# Patient Record
Sex: FEMALE | Race: BLACK OR AFRICAN AMERICAN | NOT HISPANIC OR LATINO | URBAN - METROPOLITAN AREA
[De-identification: names, ages, dates, MRNs, and addresses within clinical notes are randomized per-mention and may not be internally consistent; named-entity substitution may affect disease eponyms.]

---

## 2019-03-26 ENCOUNTER — EMERGENCY (EMERGENCY)
Facility: HOSPITAL | Age: 74
LOS: 0 days | Discharge: HOME | End: 2019-03-27
Attending: EMERGENCY MEDICINE
Payer: COMMERCIAL

## 2019-03-26 VITALS
OXYGEN SATURATION: 100 % | TEMPERATURE: 98 F | HEART RATE: 82 BPM | RESPIRATION RATE: 17 BRPM | WEIGHT: 167.99 LBS | SYSTOLIC BLOOD PRESSURE: 165 MMHG | DIASTOLIC BLOOD PRESSURE: 72 MMHG | HEIGHT: 66 IN

## 2019-03-26 PROCEDURE — 12011 RPR F/E/E/N/L/M 2.5 CM/<: CPT

## 2019-03-26 PROCEDURE — 93010 ELECTROCARDIOGRAM REPORT: CPT

## 2019-03-26 PROCEDURE — 99284 EMERGENCY DEPT VISIT MOD MDM: CPT | Mod: 25

## 2019-03-26 NOTE — ED PROVIDER NOTE - CARE PLAN
Principal Discharge DX:	Facial laceration  Secondary Diagnosis:	Facial contusion  Secondary Diagnosis:	Fall on same level, initial encounter

## 2019-03-26 NOTE — ED PROVIDER NOTE - OBJECTIVE STATEMENT
Pertinent PMH/PSH/FHx/SHx and Review of Systems contained within:    72yo F w PMH pf hyperthyroidism on methimazole presents to ED for eval s/p fall.  Pt states she was walking while holding bags of groceries then suddenly fell forward, hitting her face.  Denies LOC.  Pt states she does not recall tripping on anything, but also denies CP, SOB, dizziness, vision changes, abd pain.    No fever/chills, No photophobia/eye pain/changes in vision, No ear pain/sore throat, No chest pain/palpitations, no SOB/cough/wheeze/stridor, No abdominal pain, no dysuria/frequency/discharge, No neck/back pain, no rash, no changes in neurological status/function.

## 2019-03-26 NOTE — ED ADULT TRIAGE NOTE - CHIEF COMPLAINT QUOTE
Pt presents after falling. [place] herself going down but was carrying bags and could not break her fall, resulting in a forehead lac with bleeding controlled and swelling, also developing raccoon eyes. No rosario signs. Was on methimazole 10mg, was supposed to be reduced to 5mg, but never did reduce it. Endorses dizziness for a few weeks. Not on any blood thinners. No vertigo now while in triage. *friend is concerned pt is not acting like herself*

## 2019-03-26 NOTE — ED PROVIDER NOTE - CLINICAL SUMMARY MEDICAL DECISION MAKING FREE TEXT BOX
Pt s/p fall, ?syncope.  Labs & imaging neg for acute pathology, laceration repaired in ED, pt tolerated procedure well, remained NV intact.  Instructed on care and to have sutures removed in 5-7d.  Discussed results and outcome of testing with the patient, given copy as well.  Patient advised to please follow up with their primary care doctor within the next 24 hours and return to the Emergency Department for worsening symptoms or any other concerns.  Patient advised that their doctor may call  to follow up on the specific results of the tests performed today in the emergency department.

## 2019-03-27 VITALS
HEART RATE: 58 BPM | DIASTOLIC BLOOD PRESSURE: 83 MMHG | SYSTOLIC BLOOD PRESSURE: 141 MMHG | RESPIRATION RATE: 18 BRPM | TEMPERATURE: 98 F | OXYGEN SATURATION: 99 %

## 2019-03-27 LAB
ALBUMIN SERPL ELPH-MCNC: 3.6 G/DL — SIGNIFICANT CHANGE UP (ref 3.3–5)
ALP SERPL-CCNC: 133 U/L — HIGH (ref 40–120)
ALT FLD-CCNC: 18 U/L — SIGNIFICANT CHANGE UP (ref 12–78)
ANION GAP SERPL CALC-SCNC: 7 MMOL/L — SIGNIFICANT CHANGE UP (ref 5–17)
APTT BLD: 31 SEC — SIGNIFICANT CHANGE UP (ref 27.5–36.3)
AST SERPL-CCNC: 19 U/L — SIGNIFICANT CHANGE UP (ref 15–37)
BASOPHILS # BLD AUTO: 0.06 K/UL — SIGNIFICANT CHANGE UP (ref 0–0.2)
BASOPHILS NFR BLD AUTO: 0.7 % — SIGNIFICANT CHANGE UP (ref 0–2)
BILIRUB SERPL-MCNC: 0.2 MG/DL — SIGNIFICANT CHANGE UP (ref 0.2–1.2)
BUN SERPL-MCNC: 12 MG/DL — SIGNIFICANT CHANGE UP (ref 7–23)
CALCIUM SERPL-MCNC: 8.1 MG/DL — LOW (ref 8.5–10.1)
CHLORIDE SERPL-SCNC: 109 MMOL/L — HIGH (ref 96–108)
CK MB BLD-MCNC: 1.2 % — SIGNIFICANT CHANGE UP (ref 0–3.5)
CK MB CFR SERPL CALC: 1.9 NG/ML — SIGNIFICANT CHANGE UP (ref 0.5–3.6)
CK SERPL-CCNC: 157 U/L — SIGNIFICANT CHANGE UP (ref 26–192)
CO2 SERPL-SCNC: 28 MMOL/L — SIGNIFICANT CHANGE UP (ref 22–31)
CREAT SERPL-MCNC: 0.75 MG/DL — SIGNIFICANT CHANGE UP (ref 0.5–1.3)
EOSINOPHIL # BLD AUTO: 0.1 K/UL — SIGNIFICANT CHANGE UP (ref 0–0.5)
EOSINOPHIL NFR BLD AUTO: 1.1 % — SIGNIFICANT CHANGE UP (ref 0–6)
GLUCOSE SERPL-MCNC: 112 MG/DL — HIGH (ref 70–99)
HCT VFR BLD CALC: 34.6 % — SIGNIFICANT CHANGE UP (ref 34.5–45)
HGB BLD-MCNC: 11.8 G/DL — SIGNIFICANT CHANGE UP (ref 11.5–15.5)
IMM GRANULOCYTES NFR BLD AUTO: 0.2 % — SIGNIFICANT CHANGE UP (ref 0–1.5)
INR BLD: 1.03 RATIO — SIGNIFICANT CHANGE UP (ref 0.88–1.16)
LYMPHOCYTES # BLD AUTO: 1.07 K/UL — SIGNIFICANT CHANGE UP (ref 1–3.3)
LYMPHOCYTES # BLD AUTO: 11.6 % — LOW (ref 13–44)
MCHC RBC-ENTMCNC: 30.7 PG — SIGNIFICANT CHANGE UP (ref 27–34)
MCHC RBC-ENTMCNC: 34.1 GM/DL — SIGNIFICANT CHANGE UP (ref 32–36)
MCV RBC AUTO: 90.1 FL — SIGNIFICANT CHANGE UP (ref 80–100)
MONOCYTES # BLD AUTO: 0.79 K/UL — SIGNIFICANT CHANGE UP (ref 0–0.9)
MONOCYTES NFR BLD AUTO: 8.6 % — SIGNIFICANT CHANGE UP (ref 2–14)
NEUTROPHILS # BLD AUTO: 7.19 K/UL — SIGNIFICANT CHANGE UP (ref 1.8–7.4)
NEUTROPHILS NFR BLD AUTO: 77.8 % — HIGH (ref 43–77)
NRBC # BLD: 0 /100 WBCS — SIGNIFICANT CHANGE UP (ref 0–0)
PLATELET # BLD AUTO: 286 K/UL — SIGNIFICANT CHANGE UP (ref 150–400)
POTASSIUM SERPL-MCNC: 3.7 MMOL/L — SIGNIFICANT CHANGE UP (ref 3.5–5.3)
POTASSIUM SERPL-SCNC: 3.7 MMOL/L — SIGNIFICANT CHANGE UP (ref 3.5–5.3)
PROT SERPL-MCNC: 6.9 GM/DL — SIGNIFICANT CHANGE UP (ref 6–8.3)
PROTHROM AB SERPL-ACNC: 11.5 SEC — SIGNIFICANT CHANGE UP (ref 10–12.9)
RBC # BLD: 3.84 M/UL — SIGNIFICANT CHANGE UP (ref 3.8–5.2)
RBC # FLD: 12.8 % — SIGNIFICANT CHANGE UP (ref 10.3–14.5)
SODIUM SERPL-SCNC: 144 MMOL/L — SIGNIFICANT CHANGE UP (ref 135–145)
T3 SERPL-MCNC: 94 NG/DL — SIGNIFICANT CHANGE UP (ref 80–200)
T4 AB SER-ACNC: 4.3 UG/DL — LOW (ref 4.6–12)
TROPONIN I SERPL-MCNC: <.015 NG/ML — SIGNIFICANT CHANGE UP (ref 0.01–0.04)
TROPONIN I SERPL-MCNC: <.015 NG/ML — SIGNIFICANT CHANGE UP (ref 0.01–0.04)
TSH SERPL-MCNC: 1.47 UU/ML — SIGNIFICANT CHANGE UP (ref 0.36–3.74)
WBC # BLD: 9.23 K/UL — SIGNIFICANT CHANGE UP (ref 3.8–10.5)
WBC # FLD AUTO: 9.23 K/UL — SIGNIFICANT CHANGE UP (ref 3.8–10.5)

## 2019-03-27 PROCEDURE — 70450 CT HEAD/BRAIN W/O DYE: CPT | Mod: 26

## 2019-03-27 PROCEDURE — 70486 CT MAXILLOFACIAL W/O DYE: CPT | Mod: 26

## 2019-03-27 PROCEDURE — 72125 CT NECK SPINE W/O DYE: CPT | Mod: 26

## 2019-03-27 RX ORDER — BACITRACIN ZINC 500 UNIT/G
1 OINTMENT IN PACKET (EA) TOPICAL
Qty: 1 | Refills: 0
Start: 2019-03-27 | End: 2019-04-02

## 2019-03-27 RX ORDER — IBUPROFEN 200 MG
1 TABLET ORAL
Qty: 28 | Refills: 0
Start: 2019-03-27 | End: 2019-04-02

## 2019-03-27 RX ORDER — METHOCARBAMOL 500 MG/1
1 TABLET, FILM COATED ORAL
Qty: 15 | Refills: 0
Start: 2019-03-27 | End: 2019-03-31

## 2019-03-27 NOTE — ED ADULT NURSE REASSESSMENT NOTE - NS ED NURSE REASSESS COMMENT FT1
Patient remains better while in th ED, lac repair done by Dr Bolanos tolerated well. Pending repeat trope results and MD disposition.

## 2019-03-27 NOTE — ED ADULT NURSE NOTE - NSIMPLEMENTINTERV_GEN_ALL_ED
Implemented All Fall with Harm Risk Interventions:  [place] to call system. Call bell, personal items and telephone within reach. Instruct patient to call for assistance. Room bathroom lighting operational. Non-slip footwear when patient is off stretcher. Physically safe environment: no spills, clutter or unnecessary equipment. Stretcher in lowest position, wheels locked, appropriate side rails in place. Provide visual cue, wrist band, yellow gown, etc. Monitor gait and stability. Monitor for mental status changes and reorient to person, place, and time. Review medications for side effects contributing to fall risk. Reinforce activity limits and safety measures with patient and family. Provide visual clues: red socks.

## 2019-03-27 NOTE — ED ADULT NURSE NOTE - CHIEF COMPLAINT QUOTE
Pt presents after falling. [place] herself going down but was carrying bags and could not break her fall, resulting in a forehead lac with bleeding controlled and swelling, also developing raccoon eyes. No rosario signs. Was on methimazole 10mg, was supposed to be reduced to 5mg, but never did reduce it. Endorses dizziness for a few weeks. Not on any blood thinners. No vertigo now while in triage. *friend is concerned pt is not acting like herself*

## 2019-03-27 NOTE — ED PROCEDURE NOTE - PROCEDURE ADDITIONAL DETAILS
7 sutures placed 6-0 ethilon 7 sutures placed 6-0 ethilon, pt tolerated procedure well, remained NV intact

## 2019-03-27 NOTE — ED ADULT NURSE NOTE - OBJECTIVE STATEMENT
Break Coverage- Pt presents to ED after falling. She was carrying bags and felt dizzy and fell onto the ground face first, unable to break her fall. Pt has wound to forehead and swollen bridge of nose. bleeding controlled. Pt states she feels like she has a blood clot in her nose. respirations even and unlabored. denies dizziness at this time. states she has a hx of thyroid condition and was taking the incorrect dose of methimazole. uses propanolol PRN (has only used it 3x).  Denies head/neck/back pain. iv placed, labs drawn and sent., 20 ga, r ac. vitals as noted.. awaiting ct.

## 2019-03-28 DIAGNOSIS — S01.81XA LACERATION WITHOUT FOREIGN BODY OF OTHER PART OF HEAD, INITIAL ENCOUNTER: ICD-10-CM
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
## 2019-03-28 DIAGNOSIS — E05.90 THYROTOXICOSIS, UNSPECIFIED WITHOUT THYROTOXIC CRISIS OR STORM: ICD-10-CM

## 2019-03-28 DIAGNOSIS — S00.81XA ABRASION OF OTHER PART OF HEAD, INITIAL ENCOUNTER: ICD-10-CM
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
## 2019-04-02 ENCOUNTER — APPOINTMENT (OUTPATIENT)
Dept: FAMILY MEDICINE | Facility: CLINIC | Age: 74
End: 2019-04-02
Payer: COMMERCIAL

## 2019-04-02 VITALS
RESPIRATION RATE: 16 BRPM | DIASTOLIC BLOOD PRESSURE: 70 MMHG | HEIGHT: 66 IN | OXYGEN SATURATION: 98 % | SYSTOLIC BLOOD PRESSURE: 125 MMHG | HEART RATE: 85 BPM | WEIGHT: 168 LBS | BODY MASS INDEX: 27 KG/M2

## 2019-04-02 DIAGNOSIS — E05.90 THYROTOXICOSIS, UNSPECIFIED W/OUT THYROTOXIC CRISIS OR STORM: ICD-10-CM

## 2019-04-02 DIAGNOSIS — Z48.02 ENCOUNTER FOR REMOVAL OF SUTURES: ICD-10-CM

## 2019-04-02 PROBLEM — Z00.00 ENCOUNTER FOR PREVENTIVE HEALTH EXAMINATION: Status: ACTIVE | Noted: 2019-04-02

## 2019-04-02 PROCEDURE — 99203 OFFICE O/P NEW LOW 30 MIN: CPT

## 2019-04-02 RX ORDER — AMOXICILLIN AND CLAVULANATE POTASSIUM 875; 125 MG/1; MG/1
875-125 TABLET, COATED ORAL
Qty: 10 | Refills: 0 | Status: DISCONTINUED | COMMUNITY
Start: 2019-03-27

## 2019-04-02 RX ORDER — METHIMAZOLE 5 MG/1
5 TABLET ORAL
Qty: 90 | Refills: 0 | Status: DISCONTINUED | COMMUNITY
Start: 2019-01-22

## 2019-04-02 RX ORDER — BACITRACIN ZINC 500 [USP'U]/G
500 OINTMENT TOPICAL
Qty: 28 | Refills: 0 | Status: DISCONTINUED | COMMUNITY
Start: 2019-03-27

## 2019-04-02 RX ORDER — PROPRANOLOL HYDROCHLORIDE 10 MG/1
10 TABLET ORAL
Qty: 90 | Refills: 0 | Status: DISCONTINUED | COMMUNITY
Start: 2018-10-08

## 2019-04-02 RX ORDER — IBUPROFEN 600 MG/1
600 TABLET, FILM COATED ORAL
Qty: 28 | Refills: 0 | Status: DISCONTINUED | COMMUNITY
Start: 2019-03-27

## 2019-04-02 RX ORDER — METHOCARBAMOL 750 MG/1
750 TABLET, FILM COATED ORAL
Qty: 15 | Refills: 0 | Status: DISCONTINUED | COMMUNITY
Start: 2019-03-27

## 2019-04-02 RX ORDER — METHIMAZOLE 10 MG/1
10 TABLET ORAL
Qty: 180 | Refills: 0 | Status: DISCONTINUED | COMMUNITY
Start: 2018-10-01

## 2023-07-26 ENCOUNTER — OFFICE (OUTPATIENT)
Dept: URBAN - METROPOLITAN AREA CLINIC 90 | Facility: CLINIC | Age: 78
Setting detail: OPHTHALMOLOGY
End: 2023-07-26
Payer: MEDICARE

## 2023-07-26 DIAGNOSIS — H25.13: ICD-10-CM

## 2023-07-26 DIAGNOSIS — H40.033: ICD-10-CM

## 2023-07-26 DIAGNOSIS — H21.233: ICD-10-CM

## 2023-07-26 DIAGNOSIS — H52.13: ICD-10-CM

## 2023-07-26 DIAGNOSIS — H18.053: ICD-10-CM

## 2023-07-26 PROBLEM — H52.7 REFRACTIVE ERROR: Status: ACTIVE | Noted: 2023-07-26

## 2023-07-26 PROCEDURE — 92015 DETERMINE REFRACTIVE STATE: CPT | Performed by: OPHTHALMOLOGY

## 2023-07-26 PROCEDURE — 92004 COMPRE OPH EXAM NEW PT 1/>: CPT | Performed by: OPHTHALMOLOGY

## 2023-07-26 ASSESSMENT — VISUAL ACUITY
OD_BCVA: 20/80-1
OS_BCVA: 20/25-1

## 2023-07-26 ASSESSMENT — REFRACTION_MANIFEST
OS_VA1: 20/40-
OS_SPHERE: -6.00
OS_CYLINDER: SPH
OS_CYLINDER: SPH
OD_SPHERE: -4.50
OD_SPHERE: -4.50
OD_VA1: 20/25
OD_ADD: +2.75
OD_AXIS: 110
OD_CYLINDER: -1.25
OS_ADD: +2.75
OD_CYLINDER: -1.25
OS_SPHERE: -7.00
OD_AXIS: 110

## 2023-07-26 ASSESSMENT — REFRACTION_AUTOREFRACTION
OD_CYLINDER: -1.25
OS_CYLINDER: 0.00
OS_AXIS: 000
OD_SPHERE: -4.25
OS_SPHERE: -6.50
OD_AXIS: 111

## 2023-07-26 ASSESSMENT — AXIALLENGTH_DERIVED
OD_AL: 25.4486
OS_AL: 26.2004
OD_AL: 25.3358
OD_AL: 25.4486

## 2023-07-26 ASSESSMENT — KERATOMETRY
OD_AXISANGLE_DEGREES: 071
OD_K2POWER_DIOPTERS: 44.75
METHOD_AUTO_MANUAL: AUTO
OD_K1POWER_DIOPTERS: 43.75
OS_K2POWER_DIOPTERS: 44.50
OS_K1POWER_DIOPTERS: 43.50
OS_AXISANGLE_DEGREES: 040

## 2023-07-26 ASSESSMENT — REFRACTION_CURRENTRX
OD_SPHERE: -4.00
OS_CYLINDER: -1.25
OS_OVR_VA: 20/
OD_VPRISM_DIRECTION: SV
OD_CYLINDER: -1.00
OD_AXIS: 112
OS_SPHERE: -3.75
OS_VPRISM_DIRECTION: SV
OS_AXIS: 064
OD_OVR_VA: 20/

## 2023-07-26 ASSESSMENT — CONFRONTATIONAL VISUAL FIELD TEST (CVF)
OD_FINDINGS: FULL
OS_FINDINGS: FULL

## 2023-07-26 ASSESSMENT — SPHEQUIV_DERIVED
OS_SPHEQUIV: -6.5
OD_SPHEQUIV: -4.875
OD_SPHEQUIV: -5.125
OD_SPHEQUIV: -5.125

## 2024-03-23 ENCOUNTER — INPATIENT (INPATIENT)
Facility: HOSPITAL | Age: 79
LOS: 5 days | Discharge: NURSING, REHAB OR LONG-TERM CARE | DRG: 871 | End: 2024-03-29
Attending: STUDENT IN AN ORGANIZED HEALTH CARE EDUCATION/TRAINING PROGRAM | Admitting: SPECIALIST
Payer: MEDICARE

## 2024-03-23 ENCOUNTER — TRANSCRIPTION ENCOUNTER (OUTPATIENT)
Age: 79
End: 2024-03-23

## 2024-03-23 VITALS
WEIGHT: 134.92 LBS | RESPIRATION RATE: 16 BRPM | DIASTOLIC BLOOD PRESSURE: 52 MMHG | TEMPERATURE: 98 F | HEIGHT: 66 IN | OXYGEN SATURATION: 96 % | SYSTOLIC BLOOD PRESSURE: 106 MMHG | HEART RATE: 106 BPM

## 2024-03-23 DIAGNOSIS — I48.91 UNSPECIFIED ATRIAL FIBRILLATION: ICD-10-CM

## 2024-03-23 LAB
A1C WITH ESTIMATED AVERAGE GLUCOSE RESULT: 5.2 % — SIGNIFICANT CHANGE UP (ref 4–5.6)
ALBUMIN SERPL ELPH-MCNC: 3.9 G/DL — SIGNIFICANT CHANGE UP (ref 3.3–5)
ALP SERPL-CCNC: 90 U/L — SIGNIFICANT CHANGE UP (ref 40–120)
ALT FLD-CCNC: 39 U/L — SIGNIFICANT CHANGE UP (ref 10–45)
ANION GAP SERPL CALC-SCNC: 22 MMOL/L — HIGH (ref 5–17)
ANION GAP SERPL CALC-SCNC: 28 MMOL/L — HIGH (ref 5–17)
APPEARANCE UR: CLEAR — SIGNIFICANT CHANGE UP
APTT BLD: 22.9 SEC — LOW (ref 24.5–35.6)
APTT BLD: 27.2 SEC — SIGNIFICANT CHANGE UP (ref 24.5–35.6)
AST SERPL-CCNC: 96 U/L — HIGH (ref 10–40)
B-OH-BUTYR SERPL-SCNC: 4.8 MMOL/L — HIGH
B-OH-BUTYR SERPL-SCNC: 5.4 MMOL/L — HIGH
BACTERIA # UR AUTO: NEGATIVE /HPF — SIGNIFICANT CHANGE UP
BASE EXCESS BLDV CALC-SCNC: -6.5 MMOL/L — LOW (ref -2–3)
BASE EXCESS BLDV CALC-SCNC: -6.5 MMOL/L — LOW (ref -2–3)
BASE EXCESS BLDV CALC-SCNC: -8.5 MMOL/L — LOW (ref -2–3)
BASOPHILS # BLD AUTO: 0.01 K/UL — SIGNIFICANT CHANGE UP (ref 0–0.2)
BASOPHILS NFR BLD AUTO: 0.1 % — SIGNIFICANT CHANGE UP (ref 0–2)
BILIRUB SERPL-MCNC: 1.2 MG/DL — SIGNIFICANT CHANGE UP (ref 0.2–1.2)
BILIRUB UR-MCNC: NEGATIVE — SIGNIFICANT CHANGE UP
BUN SERPL-MCNC: 33 MG/DL — HIGH (ref 7–23)
BUN SERPL-MCNC: 44 MG/DL — HIGH (ref 7–23)
CA-I SERPL-SCNC: 1.1 MMOL/L — LOW (ref 1.15–1.33)
CA-I SERPL-SCNC: 1.19 MMOL/L — SIGNIFICANT CHANGE UP (ref 1.15–1.33)
CA-I SERPL-SCNC: 1.2 MMOL/L — SIGNIFICANT CHANGE UP (ref 1.15–1.33)
CALCIUM SERPL-MCNC: 10 MG/DL — SIGNIFICANT CHANGE UP (ref 8.4–10.5)
CALCIUM SERPL-MCNC: 8.5 MG/DL — SIGNIFICANT CHANGE UP (ref 8.4–10.5)
CAST: 2 /LPF — SIGNIFICANT CHANGE UP (ref 0–4)
CHLORIDE BLDV-SCNC: 108 MMOL/L — SIGNIFICANT CHANGE UP (ref 96–108)
CHLORIDE BLDV-SCNC: 110 MMOL/L — HIGH (ref 96–108)
CHLORIDE BLDV-SCNC: 111 MMOL/L — HIGH (ref 96–108)
CHLORIDE SERPL-SCNC: 109 MMOL/L — HIGH (ref 96–108)
CHLORIDE SERPL-SCNC: 110 MMOL/L — HIGH (ref 96–108)
CK SERPL-CCNC: 1565 U/L — HIGH (ref 25–170)
CO2 BLDV-SCNC: 18 MMOL/L — LOW (ref 22–26)
CO2 BLDV-SCNC: 19 MMOL/L — LOW (ref 22–26)
CO2 BLDV-SCNC: 22 MMOL/L — SIGNIFICANT CHANGE UP (ref 22–26)
CO2 SERPL-SCNC: 15 MMOL/L — LOW (ref 22–31)
CO2 SERPL-SCNC: 17 MMOL/L — LOW (ref 22–31)
COLOR SPEC: YELLOW — SIGNIFICANT CHANGE UP
CREAT SERPL-MCNC: 1.17 MG/DL — SIGNIFICANT CHANGE UP (ref 0.5–1.3)
CREAT SERPL-MCNC: 1.18 MG/DL — SIGNIFICANT CHANGE UP (ref 0.5–1.3)
DIFF PNL FLD: NEGATIVE — SIGNIFICANT CHANGE UP
EGFR: 48 ML/MIN/1.73M2 — LOW
EGFR: 48 ML/MIN/1.73M2 — LOW
EOSINOPHIL # BLD AUTO: 0 K/UL — SIGNIFICANT CHANGE UP (ref 0–0.5)
EOSINOPHIL NFR BLD AUTO: 0 % — SIGNIFICANT CHANGE UP (ref 0–6)
ESTIMATED AVERAGE GLUCOSE: 103 MG/DL — SIGNIFICANT CHANGE UP (ref 68–114)
FLUAV AG NPH QL: SIGNIFICANT CHANGE UP
FLUBV AG NPH QL: SIGNIFICANT CHANGE UP
GAS PNL BLDV: 141 MMOL/L — SIGNIFICANT CHANGE UP (ref 136–145)
GAS PNL BLDV: 142 MMOL/L — SIGNIFICANT CHANGE UP (ref 136–145)
GAS PNL BLDV: 142 MMOL/L — SIGNIFICANT CHANGE UP (ref 136–145)
GAS PNL BLDV: SIGNIFICANT CHANGE UP
GLUCOSE BLDV-MCNC: 132 MG/DL — HIGH (ref 70–99)
GLUCOSE BLDV-MCNC: 148 MG/DL — HIGH (ref 70–99)
GLUCOSE BLDV-MCNC: 88 MG/DL — SIGNIFICANT CHANGE UP (ref 70–99)
GLUCOSE SERPL-MCNC: 155 MG/DL — HIGH (ref 70–99)
GLUCOSE SERPL-MCNC: 524 MG/DL — CRITICAL HIGH (ref 70–99)
GLUCOSE UR QL: NEGATIVE MG/DL — SIGNIFICANT CHANGE UP
HCO3 BLDV-SCNC: 17 MMOL/L — LOW (ref 22–29)
HCO3 BLDV-SCNC: 18 MMOL/L — LOW (ref 22–29)
HCO3 BLDV-SCNC: 21 MMOL/L — LOW (ref 22–29)
HCT VFR BLD CALC: 31.1 % — LOW (ref 34.5–45)
HCT VFR BLD CALC: 37.4 % — SIGNIFICANT CHANGE UP (ref 34.5–45)
HCT VFR BLDA CALC: 34 % — LOW (ref 34.5–46.5)
HCT VFR BLDA CALC: 35 % — SIGNIFICANT CHANGE UP (ref 34.5–46.5)
HCT VFR BLDA CALC: 40 % — SIGNIFICANT CHANGE UP (ref 34.5–46.5)
HGB BLD CALC-MCNC: 11.3 G/DL — LOW (ref 11.7–16.1)
HGB BLD CALC-MCNC: 11.7 G/DL — SIGNIFICANT CHANGE UP (ref 11.7–16.1)
HGB BLD CALC-MCNC: 13.4 G/DL — SIGNIFICANT CHANGE UP (ref 11.7–16.1)
HGB BLD-MCNC: 10.5 G/DL — LOW (ref 11.5–15.5)
HGB BLD-MCNC: 13.1 G/DL — SIGNIFICANT CHANGE UP (ref 11.5–15.5)
IMM GRANULOCYTES NFR BLD AUTO: 1.2 % — HIGH (ref 0–0.9)
INR BLD: 1.07 RATIO — SIGNIFICANT CHANGE UP (ref 0.85–1.18)
KETONES UR-MCNC: 15 MG/DL
LACTATE BLDV-MCNC: 1.2 MMOL/L — SIGNIFICANT CHANGE UP (ref 0.5–2)
LACTATE BLDV-MCNC: 1.8 MMOL/L — SIGNIFICANT CHANGE UP (ref 0.5–2)
LACTATE BLDV-MCNC: 2.8 MMOL/L — HIGH (ref 0.5–2)
LEUKOCYTE ESTERASE UR-ACNC: ABNORMAL
LYMPHOCYTES # BLD AUTO: 0.61 K/UL — LOW (ref 1–3.3)
LYMPHOCYTES # BLD AUTO: 4 % — LOW (ref 13–44)
MCHC RBC-ENTMCNC: 29.7 PG — SIGNIFICANT CHANGE UP (ref 27–34)
MCHC RBC-ENTMCNC: 30 PG — SIGNIFICANT CHANGE UP (ref 27–34)
MCHC RBC-ENTMCNC: 33.8 GM/DL — SIGNIFICANT CHANGE UP (ref 32–36)
MCHC RBC-ENTMCNC: 35 GM/DL — SIGNIFICANT CHANGE UP (ref 32–36)
MCV RBC AUTO: 85.8 FL — SIGNIFICANT CHANGE UP (ref 80–100)
MCV RBC AUTO: 88.1 FL — SIGNIFICANT CHANGE UP (ref 80–100)
MONOCYTES # BLD AUTO: 0.58 K/UL — SIGNIFICANT CHANGE UP (ref 0–0.9)
MONOCYTES NFR BLD AUTO: 3.8 % — SIGNIFICANT CHANGE UP (ref 2–14)
NEUTROPHILS # BLD AUTO: 13.94 K/UL — HIGH (ref 1.8–7.4)
NEUTROPHILS NFR BLD AUTO: 90.9 % — HIGH (ref 43–77)
NITRITE UR-MCNC: NEGATIVE — SIGNIFICANT CHANGE UP
NRBC # BLD: 0 /100 WBCS — SIGNIFICANT CHANGE UP (ref 0–0)
NRBC # BLD: 0 /100 WBCS — SIGNIFICANT CHANGE UP (ref 0–0)
NT-PROBNP SERPL-SCNC: 3354 PG/ML — HIGH (ref 0–300)
PCO2 BLDV: 33 MMHG — LOW (ref 39–42)
PCO2 BLDV: 36 MMHG — LOW (ref 39–42)
PCO2 BLDV: 46 MMHG — HIGH (ref 39–42)
PH BLDV: 7.26 — LOW (ref 7.32–7.43)
PH BLDV: 7.29 — LOW (ref 7.32–7.43)
PH BLDV: 7.35 — SIGNIFICANT CHANGE UP (ref 7.32–7.43)
PH UR: 5.5 — SIGNIFICANT CHANGE UP (ref 5–8)
PLATELET # BLD AUTO: 281 K/UL — SIGNIFICANT CHANGE UP (ref 150–400)
PLATELET # BLD AUTO: 304 K/UL — SIGNIFICANT CHANGE UP (ref 150–400)
PO2 BLDV: 36 MMHG — SIGNIFICANT CHANGE UP (ref 25–45)
PO2 BLDV: 48 MMHG — HIGH (ref 25–45)
PO2 BLDV: 49 MMHG — HIGH (ref 25–45)
POTASSIUM BLDV-SCNC: 3 MMOL/L — LOW (ref 3.5–5.1)
POTASSIUM BLDV-SCNC: 3.3 MMOL/L — LOW (ref 3.5–5.1)
POTASSIUM BLDV-SCNC: 7.4 MMOL/L — CRITICAL HIGH (ref 3.5–5.1)
POTASSIUM SERPL-MCNC: 3 MMOL/L — LOW (ref 3.5–5.3)
POTASSIUM SERPL-MCNC: 3.8 MMOL/L — SIGNIFICANT CHANGE UP (ref 3.5–5.3)
POTASSIUM SERPL-SCNC: 3 MMOL/L — LOW (ref 3.5–5.3)
POTASSIUM SERPL-SCNC: 3.8 MMOL/L — SIGNIFICANT CHANGE UP (ref 3.5–5.3)
PROT SERPL-MCNC: 6.7 G/DL — SIGNIFICANT CHANGE UP (ref 6–8.3)
PROT UR-MCNC: SIGNIFICANT CHANGE UP MG/DL
PROTHROM AB SERPL-ACNC: 11.8 SEC — SIGNIFICANT CHANGE UP (ref 9.5–13)
RBC # BLD: 3.53 M/UL — LOW (ref 3.8–5.2)
RBC # BLD: 4.36 M/UL — SIGNIFICANT CHANGE UP (ref 3.8–5.2)
RBC # FLD: 13.3 % — SIGNIFICANT CHANGE UP (ref 10.3–14.5)
RBC # FLD: 13.6 % — SIGNIFICANT CHANGE UP (ref 10.3–14.5)
RBC CASTS # UR COMP ASSIST: 1 /HPF — SIGNIFICANT CHANGE UP (ref 0–4)
RSV RNA NPH QL NAA+NON-PROBE: SIGNIFICANT CHANGE UP
SAO2 % BLDV: 58.2 % — LOW (ref 67–88)
SAO2 % BLDV: 76.8 % — SIGNIFICANT CHANGE UP (ref 67–88)
SAO2 % BLDV: 77.8 % — SIGNIFICANT CHANGE UP (ref 67–88)
SARS-COV-2 RNA SPEC QL NAA+PROBE: SIGNIFICANT CHANGE UP
SODIUM SERPL-SCNC: 147 MMOL/L — HIGH (ref 135–145)
SODIUM SERPL-SCNC: 154 MMOL/L — HIGH (ref 135–145)
SP GR SPEC: 1.01 — SIGNIFICANT CHANGE UP (ref 1–1.03)
SQUAMOUS # UR AUTO: 2 /HPF — SIGNIFICANT CHANGE UP (ref 0–5)
T4 FREE SERPL-MCNC: 1.1 NG/DL — SIGNIFICANT CHANGE UP (ref 0.9–1.8)
TROPONIN T, HIGH SENSITIVITY RESULT: 67 NG/L — HIGH (ref 0–51)
TROPONIN T, HIGH SENSITIVITY RESULT: 68 NG/L — HIGH (ref 0–51)
TROPONIN T, HIGH SENSITIVITY RESULT: 85 NG/L — HIGH (ref 0–51)
TSH SERPL-MCNC: 0.97 UIU/ML — SIGNIFICANT CHANGE UP (ref 0.27–4.2)
UROBILINOGEN FLD QL: 0.2 MG/DL — SIGNIFICANT CHANGE UP (ref 0.2–1)
WBC # BLD: 15.32 K/UL — HIGH (ref 3.8–10.5)
WBC # BLD: 15.7 K/UL — HIGH (ref 3.8–10.5)
WBC # FLD AUTO: 15.32 K/UL — HIGH (ref 3.8–10.5)
WBC # FLD AUTO: 15.7 K/UL — HIGH (ref 3.8–10.5)
WBC UR QL: 6 /HPF — HIGH (ref 0–5)

## 2024-03-23 PROCEDURE — 99291 CRITICAL CARE FIRST HOUR: CPT

## 2024-03-23 PROCEDURE — 99222 1ST HOSP IP/OBS MODERATE 55: CPT | Mod: GC

## 2024-03-23 RX ORDER — SODIUM CHLORIDE 9 MG/ML
1000 INJECTION INTRAMUSCULAR; INTRAVENOUS; SUBCUTANEOUS ONCE
Refills: 0 | Status: COMPLETED | OUTPATIENT
Start: 2024-03-23 | End: 2024-03-23

## 2024-03-23 RX ORDER — POTASSIUM CHLORIDE 20 MEQ
10 PACKET (EA) ORAL
Refills: 0 | Status: COMPLETED | OUTPATIENT
Start: 2024-03-23 | End: 2024-03-23

## 2024-03-23 RX ORDER — NOREPINEPHRINE BITARTRATE/D5W 8 MG/250ML
0.05 PLASTIC BAG, INJECTION (ML) INTRAVENOUS
Qty: 8 | Refills: 0 | Status: DISCONTINUED | OUTPATIENT
Start: 2024-03-23 | End: 2024-03-24

## 2024-03-23 RX ORDER — SODIUM CHLORIDE 9 MG/ML
1000 INJECTION, SOLUTION INTRAVENOUS
Refills: 0 | Status: DISCONTINUED | OUTPATIENT
Start: 2024-03-23 | End: 2024-03-24

## 2024-03-23 RX ORDER — HEPARIN SODIUM 5000 [USP'U]/ML
2500 INJECTION INTRAVENOUS; SUBCUTANEOUS EVERY 6 HOURS
Refills: 0 | Status: DISCONTINUED | OUTPATIENT
Start: 2024-03-23 | End: 2024-03-24

## 2024-03-23 RX ORDER — HEPARIN SODIUM 5000 [USP'U]/ML
5500 INJECTION INTRAVENOUS; SUBCUTANEOUS EVERY 6 HOURS
Refills: 0 | Status: DISCONTINUED | OUTPATIENT
Start: 2024-03-23 | End: 2024-03-24

## 2024-03-23 RX ORDER — ALBUMIN HUMAN 25 %
250 VIAL (ML) INTRAVENOUS ONCE
Refills: 0 | Status: COMPLETED | OUTPATIENT
Start: 2024-03-23 | End: 2024-03-23

## 2024-03-23 RX ORDER — HEPARIN SODIUM 5000 [USP'U]/ML
INJECTION INTRAVENOUS; SUBCUTANEOUS
Qty: 25000 | Refills: 0 | Status: DISCONTINUED | OUTPATIENT
Start: 2024-03-23 | End: 2024-03-24

## 2024-03-23 RX ORDER — SODIUM CHLORIDE 9 MG/ML
1000 INJECTION, SOLUTION INTRAVENOUS ONCE
Refills: 0 | Status: COMPLETED | OUTPATIENT
Start: 2024-03-23 | End: 2024-03-23

## 2024-03-23 RX ORDER — HEPARIN SODIUM 5000 [USP'U]/ML
5500 INJECTION INTRAVENOUS; SUBCUTANEOUS ONCE
Refills: 0 | Status: COMPLETED | OUTPATIENT
Start: 2024-03-23 | End: 2024-03-23

## 2024-03-23 RX ORDER — PIPERACILLIN AND TAZOBACTAM 4; .5 G/20ML; G/20ML
3.38 INJECTION, POWDER, LYOPHILIZED, FOR SOLUTION INTRAVENOUS ONCE
Refills: 0 | Status: COMPLETED | OUTPATIENT
Start: 2024-03-23 | End: 2024-03-23

## 2024-03-23 RX ORDER — VANCOMYCIN HCL 1 G
1000 VIAL (EA) INTRAVENOUS ONCE
Refills: 0 | Status: COMPLETED | OUTPATIENT
Start: 2024-03-23 | End: 2024-03-23

## 2024-03-23 RX ADMIN — Medication 100 MILLIEQUIVALENT(S): at 13:10

## 2024-03-23 RX ADMIN — Medication 100 MILLIEQUIVALENT(S): at 14:12

## 2024-03-23 RX ADMIN — Medication 100 MILLIEQUIVALENT(S): at 12:09

## 2024-03-23 RX ADMIN — SODIUM CHLORIDE 1000 MILLILITER(S): 9 INJECTION INTRAMUSCULAR; INTRAVENOUS; SUBCUTANEOUS at 13:06

## 2024-03-23 RX ADMIN — HEPARIN SODIUM 5500 UNIT(S): 5000 INJECTION INTRAVENOUS; SUBCUTANEOUS at 22:46

## 2024-03-23 RX ADMIN — Medication 125 MILLIGRAM(S): at 15:54

## 2024-03-23 RX ADMIN — HEPARIN SODIUM 1200 UNIT(S)/HR: 5000 INJECTION INTRAVENOUS; SUBCUTANEOUS at 15:51

## 2024-03-23 RX ADMIN — Medication 250 MILLIGRAM(S): at 12:08

## 2024-03-23 RX ADMIN — Medication 6.27 MICROGRAM(S)/KG/MIN: at 15:54

## 2024-03-23 RX ADMIN — SODIUM CHLORIDE 1000 MILLILITER(S): 9 INJECTION INTRAMUSCULAR; INTRAVENOUS; SUBCUTANEOUS at 10:15

## 2024-03-23 RX ADMIN — HEPARIN SODIUM 1500 UNIT(S)/HR: 5000 INJECTION INTRAVENOUS; SUBCUTANEOUS at 22:44

## 2024-03-23 RX ADMIN — Medication 125 MILLILITER(S): at 23:05

## 2024-03-23 RX ADMIN — HEPARIN SODIUM 5500 UNIT(S): 5000 INJECTION INTRAVENOUS; SUBCUTANEOUS at 15:52

## 2024-03-23 RX ADMIN — PIPERACILLIN AND TAZOBACTAM 200 GRAM(S): 4; .5 INJECTION, POWDER, LYOPHILIZED, FOR SOLUTION INTRAVENOUS at 11:20

## 2024-03-23 RX ADMIN — SODIUM CHLORIDE 1000 MILLILITER(S): 9 INJECTION, SOLUTION INTRAVENOUS at 11:20

## 2024-03-23 RX ADMIN — SODIUM CHLORIDE 125 MILLILITER(S): 9 INJECTION, SOLUTION INTRAVENOUS at 18:12

## 2024-03-23 NOTE — ED ADULT NURSE NOTE - OBJECTIVE STATEMENT
Pt came in via ambulance because pt was found by neighbors on the floor. Per EMS pt was on the floor since Tuesday. Pt was eating sardines and was using bottles to urinate and defecate. Pt is soaked in urine with poor hygiene. EMS states that the place is a hoarding situation. Pt with redness on bilateral shoulders, elbows, and knees as well as bilateral hips. Stage 2 decub noted on sacral area.  Pt is in no distress. Breathing easy and non labored. Pt is hypothermic. Pt is alert and orientedX3. Pt states she fell. Pt remembers the fall.

## 2024-03-23 NOTE — CONSULT NOTE ADULT - SUBJECTIVE AND OBJECTIVE BOX
Patient:  RAMONE MARTINEZ  [id number]    CHIEF COMPLAINT: S/p mechanical fall    HPI: Ms. Martinez is a 79 YO woman with PMH of Parkinson's, HTN, who was brought in by EMS after being found down by a neighbor. She reportedly fell on Tuesday per EMS, has been down since then, using bottles to defecate and urinate into. Patient remembers falling, per EMS likely mechanical as patient's home reflects hoarding. She has been eating tinned sardines while down. She notes taking medication, but does not remember which ones nor when she last took them. EMS noted an old bottle of losartan. At this time, patient notes areas of pressure when turned, and thirstiness but otherwise denies complaints.    Pt reports she had abdominal pain after receiving abx in ED, but none prior. Pt reports chronic constipation and denies N/V, chest pain, SOB, dizziness, recent sick contacts, or travel.     PAST MEDICAL & SURGICAL HISTORY:  H/O Parkinson's disease      HTN (hypertension)          FAMILY HISTORY:      SOCIAL HISTORY:    Allergies    No Known Allergies    Intolerances        HOME MEDICATIONS:    REVIEW OF SYSTEMS:  [ ] Unable to assess ROS because ______  [X] Negative except as stated in HPI  CONSTITUTIONAL: No fever, chills, night sweats, or fatigue  EYES: No eye pain, visual disturbances, or discharge  ENMT:  No difficulty hearing, tinnitus, vertigo; No sinus or throat pain  NECK: No pain or stiffness  BREASTS: No pain, masses, or nipple discharge  RESPIRATORY: No cough, wheezing, or hemoptysis; No shortness of breath  CARDIOVASCULAR: No chest pain, palpitations, dizziness, or leg swelling  GASTROINTESTINAL: +constipation. Denies melena  GENITOURINARY: No dysuria, frequency, hematuria, or incontinence  NEUROLOGICAL: No headaches, memory loss, loss of strength, numbness, or tremors  SKIN: No itching, burning, rashes, or lesions   LYMPH NODES: No enlarged glands  ENDOCRINE: No heat or cold intolerance; No hair loss  MUSCULOSKELETAL: No joint pain or swelling; No muscle, back, or extremity pain  PSYCHIATRIC: No depression, anxiety, mood swings, or difficulty sleeping  HEME/LYMPH: No easy bruising, or bleeding gums  ALLERGY AND IMMUNOLOGIC: No hives or eczema    OBJECTIVE:  T(F): 98 (03-23-24 @ 15:30), Max: 98 (03-23-24 @ 15:30)  HR: 77 (03-23-24 @ 16:37) (71 - 120)  BP: 109/45 (03-23-24 @ 16:37) (57/39 - 146/130)  BP(mean): 63 (03-23-24 @ 16:37) (45 - 117)  ABP: --  ABP(mean): --  RR: 16 (03-23-24 @ 16:37) (12 - 24)  SpO2: 95% (03-23-24 @ 16:37) (94% - 98%)    I/O Summary 24H    CAPILLARY BLOOD GLUCOSE      POCT Blood Glucose.: 153 mg/dL (23 Mar 2024 10:22)      PHYSICAL EXAM:  GENERAL: +laying in bed comfortably, appears dehydrated  HEAD:  Atraumatic, Normocephalic  EYES: EOMI, PERRLA, conjunctiva and sclera clear  ENT: Moist mucous membranes  NECK: Supple, No JVD  CHEST/LUNG: Clear to auscultation bilaterally; No rales, rhonchi, wheezing, or rubs. Unlabored respirations  HEART: Regular rate and rhythm; No murmurs, rubs, or gallops  ABDOMEN: Bowel sounds present; Soft, Nontender, Nondistended. No hepatomegaly  EXTREMITIES:  2+ Peripheral Pulses, brisk capillary refill. No clubbing, cyanosis, or edema  NERVOUS SYSTEM:  Alert & Oriented X3, speech clear. No deficits   MSK: FROM all 4 extremities, full and equal strength  SKIN: No rashes or lesions    HOSPITAL MEDICATIONS:  MEDICATIONS  (STANDING):  heparin  Infusion.  Unit(s)/Hr (12 mL/Hr) IV Continuous <Continuous>  norepinephrine Infusion 0.05 MICROgram(s)/kG/Min (6.27 mL/Hr) IV Continuous <Continuous>    MEDICATIONS  (PRN):  heparin   Injectable 5500 Unit(s) IV Push every 6 hours PRN For aPTT less than 40  heparin   Injectable 2500 Unit(s) IV Push every 6 hours PRN For aPTT between 40 - 57      LABS:  CBC 03-23-24 @ 10:48                        13.1   15.32 )-----------( 304                   37.4       Hgb trend: 13.1 <--   WBC trend: 15.32 <--     CMP 03-23-24 @ 13:51    147<H>  |  109<H>  |  40<H>  ----------------------------<  147<H>  3.0<L>   |  16<L>  |  1.11    Ca    8.3<L>      03-23-24 @ 13:51  Phos  3.8     03-23  Mg     2.6     03-23    TPro  6.7  /  Alb  3.9  /  TBili  1.2  /  DBili  x   /  AST  96<H>  /  ALT  39  /  AlkPhos  90  03-23      Serum Cr trend: 1.11 <-- , 1.18 <--   PT/INR - ( 23 Mar 2024 10:48 )   PT: 11.8 sec;   INR: 1.07 ratio         PTT - ( 23 Mar 2024 10:48 ):27.2 sec    ABG Trend:     VBG Trend:   03-23-24 @ 15:46 - pH: 7.35  | pCO2: 33    | pO2: 49    | HCO3: 18    | Lactate: 1.2    03-23-24 @ 13:51 - pH: 7.29  | pCO2: 36    | pO2: 48    | HCO3: 17    | Lactate: 1.8    03-23-24 @ 10:48 - pH: 7.26  | pCO2: 46    | pO2: 36    | HCO3: 21    | Lactate: 2.8        MICROBIOLOGY:       RADIOLOGY:  [ ] Reviewed and interpreted by me    EKG Patient:  RAMONE MARTINEZ  [id number]    CHIEF COMPLAINT: S/p mechanical fall    HPI: Ms. Martinez is a 79 YO woman with PMH of Parkinson's, HTN, who was brought in by EMS after being found down by a neighbor. She reportedly fell on Tuesday per EMS, has been down since then, using bottles to defecate and urinate into. Patient remembers falling, per EMS likely mechanical as patient's home reflects hoarding. She has been eating tinned sardines while down. She notes taking medication, but does not remember which ones nor when she last took them. EMS noted an old bottle of losartan. At this time, patient notes areas of pressure when turned, and thirstiness but otherwise denies complaints.    Pt reports she had abdominal pain after receiving abx in ED, but none prior. Pt reports chronic constipation and increasing forgetfulness recently. Pt denies N/V, chest pain, SOB, dizziness, recent sick contacts, or travel.     PAST MEDICAL & SURGICAL HISTORY:  H/O Parkinson's disease      HTN (hypertension)          FAMILY HISTORY:      SOCIAL HISTORY:    Allergies    No Known Allergies    Intolerances        HOME MEDICATIONS:    REVIEW OF SYSTEMS:  [ ] Unable to assess ROS because ______  [X] Negative except as stated in HPI  CONSTITUTIONAL: No fever, chills, night sweats, or fatigue  EYES: No eye pain, visual disturbances, or discharge  ENMT:  No difficulty hearing, tinnitus, vertigo; No sinus or throat pain  NECK: No pain or stiffness  BREASTS: No pain, masses, or nipple discharge  RESPIRATORY: No cough, wheezing, or hemoptysis; No shortness of breath  CARDIOVASCULAR: No chest pain, palpitations, dizziness, or leg swelling  GASTROINTESTINAL: +constipation. Denies melena  GENITOURINARY: No dysuria, frequency, hematuria, or incontinence  NEUROLOGICAL: No headaches, memory loss, loss of strength, numbness, or tremors  SKIN: No itching, burning, rashes, or lesions   LYMPH NODES: No enlarged glands  ENDOCRINE: No heat or cold intolerance; No hair loss  MUSCULOSKELETAL: No joint pain or swelling; No muscle, back, or extremity pain  PSYCHIATRIC: No depression, anxiety, mood swings, or difficulty sleeping  HEME/LYMPH: No easy bruising, or bleeding gums  ALLERGY AND IMMUNOLOGIC: No hives or eczema    OBJECTIVE:  T(F): 98 (03-23-24 @ 15:30), Max: 98 (03-23-24 @ 15:30)  HR: 77 (03-23-24 @ 16:37) (71 - 120)  BP: 109/45 (03-23-24 @ 16:37) (57/39 - 146/130)  BP(mean): 63 (03-23-24 @ 16:37) (45 - 117)  ABP: --  ABP(mean): --  RR: 16 (03-23-24 @ 16:37) (12 - 24)  SpO2: 95% (03-23-24 @ 16:37) (94% - 98%)    I/O Summary 24H    CAPILLARY BLOOD GLUCOSE      POCT Blood Glucose.: 153 mg/dL (23 Mar 2024 10:22)      PHYSICAL EXAM:  GENERAL: +laying in bed comfortably, appears dehydrated  HEAD:  Atraumatic, Normocephalic  EYES: EOMI, PERRLA, conjunctiva and sclera clear  ENT: Moist mucous membranes  NECK: Supple, No JVD  CHEST/LUNG: Clear to auscultation bilaterally; No rales, rhonchi, wheezing, or rubs. Unlabored respirations  HEART: Regular rate and rhythm; No murmurs, rubs, or gallops  ABDOMEN: Bowel sounds present; Soft, Nontender, Nondistended. No hepatomegaly  EXTREMITIES:  2+ Peripheral Pulses, brisk capillary refill. No clubbing, cyanosis, or edema  NERVOUS SYSTEM:  Alert & Oriented X3, speech clear. No deficits   MSK: FROM all 4 extremities, full and equal strength  SKIN: No rashes or lesions    HOSPITAL MEDICATIONS:  MEDICATIONS  (STANDING):  heparin  Infusion.  Unit(s)/Hr (12 mL/Hr) IV Continuous <Continuous>  norepinephrine Infusion 0.05 MICROgram(s)/kG/Min (6.27 mL/Hr) IV Continuous <Continuous>    MEDICATIONS  (PRN):  heparin   Injectable 5500 Unit(s) IV Push every 6 hours PRN For aPTT less than 40  heparin   Injectable 2500 Unit(s) IV Push every 6 hours PRN For aPTT between 40 - 57      LABS:  CBC 03-23-24 @ 10:48                        13.1   15.32 )-----------( 304                   37.4       Hgb trend: 13.1 <--   WBC trend: 15.32 <--     CMP 03-23-24 @ 13:51    147<H>  |  109<H>  |  40<H>  ----------------------------<  147<H>  3.0<L>   |  16<L>  |  1.11    Ca    8.3<L>      03-23-24 @ 13:51  Phos  3.8     03-23  Mg     2.6     03-23    TPro  6.7  /  Alb  3.9  /  TBili  1.2  /  DBili  x   /  AST  96<H>  /  ALT  39  /  AlkPhos  90  03-23      Serum Cr trend: 1.11 <-- , 1.18 <--   PT/INR - ( 23 Mar 2024 10:48 )   PT: 11.8 sec;   INR: 1.07 ratio         PTT - ( 23 Mar 2024 10:48 ):27.2 sec    ABG Trend:     VBG Trend:   03-23-24 @ 15:46 - pH: 7.35  | pCO2: 33    | pO2: 49    | HCO3: 18    | Lactate: 1.2    03-23-24 @ 13:51 - pH: 7.29  | pCO2: 36    | pO2: 48    | HCO3: 17    | Lactate: 1.8    03-23-24 @ 10:48 - pH: 7.26  | pCO2: 46    | pO2: 36    | HCO3: 21    | Lactate: 2.8        MICROBIOLOGY:       RADIOLOGY:  [ ] Reviewed and interpreted by me    EKG

## 2024-03-23 NOTE — DISCHARGE NOTE PROVIDER - NSDCCPTREATMENT_GEN_ALL_CORE_FT
PRINCIPAL PROCEDURE  Procedure: Transthoracic echocardiography (TTE)  Findings and Treatment: 1. Left ventricular cavity is normal in size. Left ventricular wall thickness is mildly increased. Left ventricular systolic function is normal. There are no regional wall motion abnormalities seen.        SECONDARY PROCEDURE  Procedure: CT scan  Findings and Treatment: IMPRESSION:  Age-indeterminate mild L2 and moderate L3 and L4 compression deformities.   Otherwise no acute fractures in the ribs or bony pelvis.  No acute posttraumatic organ injury in the chest, abdomen or pelvis.  Subsegmental pulmonary arterial filling defect suggesting very small   peripheral pulmonary embolism. Dilated pulmonary trunk compatible with   chronic pulmonary arterial hypertension. No right heart strain.  IMPRESSION:  CT BRAIN: No acute intracranial bleeding. Central volume loss and chronic   microvascular ischemic changes.  CT CERVICAL SPINE: Age-indeterminate superior T1, T2, and T3 endplate   concavity is likely chronic and degenerative. Correlate with site of pain.  Right-sided intravenous air including the right subclavian and right   internal jugular veins extending into the deep tissues of the neck.   Correlate for instrumentation and iatrogenic causes.

## 2024-03-23 NOTE — ED ADULT NURSE REASSESSMENT NOTE - NS ED NURSE REASSESS COMMENT FT1
pt heparin titrated per FULL AC protocol with PHILOMENA Yates at bedside, rate increased by 300 to 1500 units/hr, pt given 5500 IVP heparin per protocol, pt updated on plan of care, comfort and safety secured

## 2024-03-23 NOTE — ED PROVIDER NOTE - ATTENDING CONTRIBUTION TO CARE
78 F w/ hx of parkinsons disease, reports she follows w/ Dr. Naveen Cool, presents to the ER after being found down, pt was slavaems, who reports house in disarray, pt covered in feces, urinating in her stool, pt was not seen by neighbors for the past week so they went to house unable to enter so 911 was called, pt w/ mild L sided hip pain, pt was unable to ambulate, has no cp, no sob, no nause ano vomiting, upon arrival to the Er, pt w/ hypothermia temp less than 90, pt denies pain, she is tachycardia w/ normotension, pt oriented x3 but thinks month is april, knows year is 2024, she doesn't know home meds, she has clear lungs soft abdomen, no c/t/l spine tenderness, pt w/ soft abdomen, mild L sided chest wall tenderness and L hip tenderness, pt moving arms and legs but is weak b/l. pt given hx of being found down at risk for rhabdo eval for cardiac eitiology vs intraabdominal/traumatic injury. upon arrial pt covered in feces, pt to have labs imaging and admission for weakness, additionally will require social work consult to ensures safe discharge home and PT consult as pt unable to ambulate, unclear if 2/2 pd w/ chronic deconditioning vs acute abnl   chad elder  pt w/ leukocytosis, new onset afib. to have imaging

## 2024-03-23 NOTE — ED PROVIDER NOTE - PROGRESS NOTE DETAILS
informed pt hypotensive, core temp is 92, pt currently being rewarmed, pt POCUS to be performed s/p 2L of IVF, informed w/ pt w/ hypotension, POCUS w/ plethoric IVC, pt to be started on levophed [facility] called, XR foot with possible fracture, podiatry consult placed, patient to be seen spoke w/ [place] cousin regarding pt status, pt provided consent Minerva Dexter MD ([id number]) Pt signed out to me. As per day team pt is on .1 of levo, map 63. Received 3L of fluids. Plethoric IVC on US. Pt was initially rejected by [facility].  Plan is to admit to [facility] after reassessment. Pt unable to be weaned of of levo, up titrated to .2 of levo after 5 % albumin was given. Will call [facility] again.

## 2024-03-23 NOTE — DISCHARGE NOTE PROVIDER - HOSPITAL COURSE
HPI:  79 y/o F with PMH of Parkinson's disease, HTN, initially brought in by EMS after being found down on the floor by a neighbor. Per EMS, she fell on Tuesday and was down on the floor since then, was using bottle to urinate and defecate into while eating tinned sardines. Pt reports she has been taking medications but unsure which ones, per EMS there was an old bottle of losartan at her home.     Pt AAOx4, reports that she remembers initial fall that occurred on Tuesday. She was reorganizing some of her things and slipped, fell, and a box fell on her. She felt like she could not get up after because she could not balance herself. Denies head strike LOC, but does not remember EMS entering her apartment and bringing her out on a stretcher. Denies hx of T2DM, heart disease but does report an issue with her heart valve that she was evaluated for outpatient but reports that it was "fine" and that she did not need surgery. Pt reports she only takes valsartan and furosemide at home, does not remember dosages.    In ED, pt received 2L NS bolus and 1L LR bolus, vanc, zosyn, solumedrol 125 mg IV, heparin gtt, later started on levo gtt and given albumin 5% 250cc  CT Head unremarkable, CT C-spine shows age-indeterminate T1, T2, T3 endplate concavity, and R sided intravenous air including R subclavian and IJ veins extending into deep tissues of neck.      CT Chest/A/P: age-indeterminate mild L2 and moderate L3-L4 compression deformities, no fx. Subsegmental pulmonary arterial filling defect suggesting very small peripheral PE. Dilated pulmonary trunk compatible with chronic pulmonary arterial hypertension. No R heart strain. (24 Mar 2024 00:01)    Hospital Course:      Important Medication Changes and Reason:    Active or Pending Issues Requiring Follow-up:    Advanced Directives:   [ ] Full code  [ ] DNR  [ ] Hospice    Discharge Diagnoses:         HPI:  77 y/o F with PMH of Parkinson's disease, HTN, initially brought in by EMS after being found down on the floor by a neighbor. Per EMS, she fell on Tuesday and was down on the floor since then, was using bottle to urinate and defecate into while eating tinned sardines. Pt reports she has been taking medications but unsure which ones, per EMS there was an old bottle of losartan at her home.     Pt AAOx4, reports that she remembers initial fall that occurred on Tuesday. She was reorganizing some of her things and slipped, fell, and a box fell on her. She felt like she could not get up after because she could not balance herself. Denies head strike LOC, but does not remember EMS entering her apartment and bringing her out on a stretcher. Denies hx of T2DM, heart disease but does report an issue with her heart valve that she was evaluated for outpatient but reports that it was "fine" and that she did not need surgery. Pt reports she only takes valsartan and furosemide at home, does not remember dosages.    In ED, pt received 2L NS bolus and 1L LR bolus, vanc, zosyn, solumedrol 125 mg IV, heparin gtt, later started on levo gtt and given albumin 5% 250cc  CT Head unremarkable, CT C-spine shows age-indeterminate T1, T2, T3 endplate concavity, and R sided intravenous air including R subclavian and IJ veins extending into deep tissues of neck.     CT Chest/A/P: age-indeterminate mild L2 and moderate L3-L4 compression deformities, no fx. Subsegmental pulmonary arterial filling defect suggesting very small peripheral PE. Dilated pulmonary trunk compatible with chronic pulmonary arterial hypertension. No R heart strain. (24 Mar 2024 00:01)    Hospital Course:  Pt admitted to MICU for hypotension, briefly required pressors, but was able to be weaned off quickly. Cause of hypotension was thought to be either hypovolemia or infectious - pt given aggressive IVF and started on antibiotics. Pt also started on heparin drip for PE. Pt also with rhabdomyolysis, with CKs improving with IVF. Pts BP improved after IVF resuscitation and pt was downgraded to medical floors. Pts infectious work-up was negative and antibiotics were discontinued. Pt with better appetite and clinically much improved. Pt transitioned to Eliquis for ongoing AC. PT recommended AL.   Pt need to establish outpt PMD for hypercoagulability work-up, age appropriate cancer screening and cardiac work-up as needed.       Important Medication Changes and Reason:  START Eliquis 10mg BID for 8 more doses - after which, should be on 5mg twice daily   STOP Valsartan  STOP Lasix     Active or Pending Issues Requiring Follow-up:  Pt needs to establish outpt PMD for hypercoagulability work-up, age appropriate cancer screening and cardiac work-up as needed.     Advanced Directives:   [ ] Full code  [ ] DNR  [ ] Hospice    Discharge Diagnoses:  Severe hypotension   SIRS without infection with organ dysfunction  Pulmonary thromboembolism  Rhabdomyolysis  Demand cardiac ischemia   paroxysmal Atrial fibrillation   Hypernatremia  HAGMA          77 y/o F with PMH of Parkinson's disease, HTN, initially brought in by EMS after being found down on the floor by a neighbor. Per EMS, she fell on Tuesday and was down on the floor since then, was using bottle to urinate and defecate into while eating tinned sardines. Pt reports she has been taking medications but unsure which ones, per EMS there was an old bottle of losartan at her home.     Pt AAOx4, reports that she remembers initial fall that occurred on Tuesday. She was reorganizing some of her things and slipped, fell, and a box fell on her. She felt like she could not get up after because she could not balance herself. Denies head strike LOC, but does not remember EMS entering her apartment and bringing her out on a stretcher. Denies hx of T2DM, heart disease but does report an issue with her heart valve that she was evaluated for outpatient but reports that it was "fine" and that she did not need surgery. Pt reports she only takes valsartan and furosemide at home, does not remember dosages.    Hospital Course:  Pt admitted to MICU for hypotension, briefly required pressors, but was able to be weaned off quickly. Cause of hypotension was thought to be either hypovolemia or infectious - pt given aggressive IVF and started on antibiotics. Pt also started on heparin drip for PE. Pt also with rhabdomyolysis, with CKs improving with IVF. Pts BP improved after IVF resuscitation and pt was downgraded to medical floors. Pts infectious work-up was negative and antibiotics were discontinued. Pt with better appetite and clinically much improved. Pt transitioned to Eliquis for ongoing AC. PT recommended AL.   Pt need to establish outpt PMD for hypercoagulability work-up, age appropriate cancer screening and cardiac work-up as needed.     Important Medication Changes and Reason:  START Eliquis 10mg BID for 8 more doses - after which, should be on 5mg twice daily   STOP Valsartan  STOP Lasix     Active or Pending Issues Requiring Follow-up:  Pt needs to establish outpt PMD for hypercoagulability work-up, age appropriate cancer screening and cardiac work-up as needed.     Advanced Directives:   [ x] Full code  [ ] DNR  [ ] Hospice    Discharge Diagnoses:  Severe hypotension   SIRS without infection with organ dysfunction  Pulmonary thromboembolism  Rhabdomyolysis  Demand cardiac ischemia   paroxysmal Atrial fibrillation   Hypernatremia  HAGMA

## 2024-03-23 NOTE — DISCHARGE NOTE PROVIDER - NSDCFUADDAPPT_GEN_ALL_CORE_FT
Podiatry Discharge Instructions:  Follow up: Please follow up with Dr. Arteaga within 1 week of discharge from the hospital, please call [phone] for appointment and discuss that you recently were seen in the hospital.  Wound Care: n/a  Weight bearing: n/a  -Antibiotics: Please continue as instructed. Podiatry Discharge Instructions:  Follow up: Please follow up with Dr. Ba within 1 week of discharge from the hospital, please call [phone] for appointment and discuss that you recently were seen in the hospital.  Wound Care: n/a  Weight bearing: weight bearing as tolerated in surgical shoes to bilateral, - STRICT decubitus precautions, Z- FLOWS boots at all time when in bed and in chair resting.    Podiatry Discharge Instructions:  Follow up: Please follow up with Dr. Ba within 1 week of discharge from the hospital, please call [phone] for appointment and discuss that you recently were seen in the hospital.  Wound Care: n/a  Weight bearing: weight bearing as tolerated in surgical shoes to bilateral, - STRICT decubitus precautions, Z- FLOWS boots at all time when in bed and in chair resting.     APPTS ARE READY TO BE MADE: [X] YES    Best Family or Patient Contact (if needed):    Additional Information about above appointments (if needed):    1:   2:   3:     Other comments or requests:    Podiatry Discharge Instructions:  Follow up: Please follow up with Dr. Ba within 1 week of discharge from the hospital, please call [phone] for appointment and discuss that you recently were seen in the hospital.  Wound Care: n/a  Weight bearing: weight bearing as tolerated in surgical shoes to bilateral, - STRICT decubitus precautions, Z- FLOWS boots at all time when in bed and in chair resting.     APPTS ARE READY TO BE MADE: [X] YES    Best Family or Patient Contact (if needed):    Additional Information about above appointments (if needed):    1:   2:   3:     Other comments or requests:     Patient is being discharged to [facility] ([place]). Caregiver will arrange follow up.

## 2024-03-23 NOTE — ED PROVIDER NOTE - PHYSICAL EXAMINATION
GENERAL: Thin woman, poorly groomed, hypothermic  EYES: Conjunctiva noninjected or pale, sclera anicteric  HENT: NC/AT, dry mucous membranes  NECK: Supple, trachea midline  LUNG: Nonlabored respirations  CV: RRR, Pulses- Radial: 2+ b/l  ABDOMEN: Nondistended, tenderness LLQ  MSK: No visible deformities, nontender extremities  SKIN: No rashes, bruises  NEURO: AAOx3 (to person, place, event. says it's April 2024), no tremor, some rigidity noted in extremities  PSYCH: Restricted affect

## 2024-03-23 NOTE — DISCHARGE NOTE PROVIDER - DETAILS OF MALNUTRITION DIAGNOSIS/DIAGNOSES
This patient has been assessed with a concern for Malnutrition and was treated during this hospitalization for the following Nutrition diagnosis/diagnoses:     -  03/26/2024: Severe protein-calorie malnutrition

## 2024-03-23 NOTE — DISCHARGE NOTE PROVIDER - NSDCMRMEDTOKEN_GEN_ALL_CORE_FT
furosemide 40 mg oral tablet: 1 tab(s) orally once a day  valsartan 80 mg oral tablet: 1 tab(s) orally once a day   amoxicillin-clavulanate 875 mg-125 mg oral tablet: 1 tab(s) orally every 12 hours   bacitracin 500 units/g topical ointment: Apply topically to affected area 2 times a day   furosemide 40 mg oral tablet: 1 tab(s) orally once a day  ibuprofen 600 mg oral tablet: 1 tab(s) orally every 6 hours   methocarbamol 750 mg oral tablet: 1 tab(s) orally 3 times a day   valsartan 80 mg oral tablet: 1 tab(s) orally once a day   acetaminophen 325 mg oral tablet: 2 tab(s) orally every 6 hours As needed Temp greater or equal to 38C (100.4F), Mild Pain (1 - 3)  apixaban 5 mg oral tablet: 2 tab(s) orally 2 times a day TAKE 2 tabs twice daily for 4 more days, then switch to 5mg twice daily after  polyethylene glycol 3350 oral powder for reconstitution: 17 gram(s) orally 2 times a day  senna leaf extract oral tablet: 2 tab(s) orally once a day (at bedtime)

## 2024-03-23 NOTE — DISCHARGE NOTE PROVIDER - CARE PROVIDER_API CALL
General Internal Medicine,   [address] [facility]  Phone: [phone]  Fax: (   )    -  Follow Up Time:    Naveen Cool  Cardiology  [address] [facility], [address]  [place] [address]  Phone: [phone]  Fax: [phone]  Follow Up Time:     Tomas Zafar  Neurology  [address] [facility] [address], [address]  [place] [address]  Phone: [phone]  Fax: [phone]  Follow Up Time:

## 2024-03-23 NOTE — CONSULT NOTE ADULT - ATTENDING COMMENTS
78F Hx HTN, Parkinson found down by neighbor OCTAVIANO p/w ED found Hypothermia 36.6*F, Hypotension 60/40 mmHg, Dehydration, Hypernatremia s/p 3 Li IVF resuscitation started on IV Pressor.  - A&O x 3 and FC x 4 answering questions   - Hypothermia and Hypotension resolved with /51 mmHg (MAP=71)   - Wean off IV Levophed Gtt 0.1 mcg titration to SBP >90 or MAP > 65 mmHg   - Hypernatremia and Starvation Ketosis need maintenance IV Fluid D5LR 125 cc/Hr   - F/U CMPs, Lactate, Ketone and Urine Output   - No ICU admission indicated at this time     Patient seen and examined with ICU Resident/Fellow at bedside after lab data, medical records and radiology reports reviewed. I have read and agreeable in general with resident's Documented Note, Assessment and Management Plan which reflected my opinions from bedside round and discussion. 78F Hx HTN, Parkinson found down by neighbor OCTAVIANO p/w ED found Hypothermia 36.6*F, Hypotension 60/40 mmHg, Dehydration, Hypernatremia s/p 3 Li IVF resuscitation started on IV Pressor.  - A&O x 3 and FC x 4 answering questions CTH negative    - RAO2 with CTA small segmental PE without RV Strain started on IV Hep Gtt   - Hypothermia and Hypotension resolved with /51 mmHg (MAP=71)   - Wean off IV Levophed Gtt 0.1 mcg titration to SBP >90 or MAP > 65 mmHg   - Hypernatremia and Starvation Ketosis need maintenance IV Fluid D5LR 125 cc/Hr   - F/U CMPs, Lactate, Ketone and Urine Output   - No ICU admission indicated at this time     Patient seen and examined with ICU Resident/Fellow at bedside after lab data, medical records and radiology reports reviewed. I have read and agreeable in general with resident's Documented Note, Assessment and Management Plan which reflected my opinions from bedside round and discussion.

## 2024-03-23 NOTE — CONSULT NOTE ADULT - ASSESSMENT
Ms. Martinez is a 79 YO woman with PMH of Parkinson's, HTN, who was brought in by EMS after being found down by a neighbor. CT A/P reveals age-indeterminate compression fractures to L2, L3, and L4 and small peripheral PE. MICU called for initiation of levo iso hypotension.     #Hypotension  - Pt appears dehydrated on PE  - S/p 3L fluids in ED   - Vanc and zosyn x1 in ED    Recommendations:   -       Final recommendations pending attestation by MICU attending.  Ms. Martinez is a 79 YO woman with PMH of Parkinson's, HTN, who was brought in by EMS after being found down by a neighbor. CT A/P reveals age-indeterminate compression fractures to L2, L3, and L4 and small peripheral PE. MICU called for initiation of levo iso hypotension.     #Hypotension  - Pt appears dehydrated on PE  - BP 93/53 in ED and hypothermic on presentation, leukocytosis to 15.32  - S/p 3L fluids in ED   - Vanc and zosyn x1 in ED    Recommendations:   - F/u infectious work-up including MRSA PCR, UA, Ucx, urine legionella, Strep pneumo        Final recommendations pending attestation by MICU attending.  Ms. Martinez is a 77 YO woman with PMH of Parkinson's, HTN, who was brought in by EMS after being found down by a neighbor. CT A/P reveals age-indeterminate compression fractures to L2, L3, and L4 and small peripheral PE. MICU called for initiation of levo iso hypotension.     #Hypotension  - Pt appears dehydrated on PE  - BP 93/53 in ED and hypothermic on presentation, leukocytosis to 15.32  - S/p 3L fluids in ED and initiated on levo for additional BP support  - Vanc and zosyn x1 in ED  - BP improved to 126/51 (MAP 71) and decreasing levo requirements after initiation    #Hypernatremia  - Iso severe dehydration    Recommendations:  -  Bolus 1L fluids now and start maintenance D5LR at 125 cc/hr  - Continue to wean off of levo as tolerated  - Repeat BHB after fluid resuscitation      Pt does not require ICU level of care at this time, as BP has improved w/ minimal pressor requirements and fluids resuscitation.    Final recommendations pending attestation by MICU attending.

## 2024-03-23 NOTE — ED ADULT NURSE NOTE - NSFALLHARMRISKINTERV_ED_ALL_ED
Assistance OOB with selected safe patient handling equipment if applicable/Assistance with ambulation/Communicate risk of Fall with Harm to all staff, patient, and family/Monitor gait and stability/Provide visual cue: red socks, yellow wristband, yellow gown, etc/Reinforce activity limits and safety measures with patient and family/Bed in lowest position, wheels locked, appropriate side rails in place/Call bell, personal items and telephone in reach/Instruct patient to call for assistance before getting out of bed/chair/stretcher/Non-slip footwear applied when patient is off stretcher/[place] to call system/Physically safe environment - no spills, clutter or unnecessary equipment/Purposeful Proactive Rounding/Room/bathroom lighting operational, light cord in reach

## 2024-03-23 NOTE — CONSULT NOTE ADULT - SUBJECTIVE AND OBJECTIVE BOX
Patient is a 78y old  Female who presents with a chief complaint of S/p mechanical fall (23 Mar 2024 17:00)      HPI: Ms. Martinez is a 79 YO woman with PMH of Parkinson's, HTN, who was brought in by EMS after being found down by a neighbor. She reportedly fell on Tuesday per EMS, has been down since then, using bottles to defecate and urinate into. Patient remembers falling, per EMS likely mechanical as patient's home reflects hoarding. She has been eating tinned sardines while down. She notes taking medication, but does not remember which ones nor when she last took them. EMS noted an old bottle of losartan. At this time, patient notes areas of pressure when turned, and thirstiness but otherwise denies complaint      PAST MEDICAL & SURGICAL HISTORY:  H/O Parkinson's disease      HTN (hypertension)          MEDICATIONS  (STANDING):  heparin  Infusion.  Unit(s)/Hr (12 mL/Hr) IV Continuous <Continuous>  norepinephrine Infusion 0.05 MICROgram(s)/kG/Min (6.27 mL/Hr) IV Continuous <Continuous>    MEDICATIONS  (PRN):  heparin   Injectable 5500 Unit(s) IV Push every 6 hours PRN For aPTT less than 40  heparin   Injectable 2500 Unit(s) IV Push every 6 hours PRN For aPTT between 40 - 57      Allergies    No Known Allergies    Intolerances        VITALS:    Vital Signs Last 24 Hrs  T(C): 36.7 (23 Mar 2024 15:30), Max: 36.7 (23 Mar 2024 15:30)  T(F): 98 (23 Mar 2024 15:30), Max: 98 (23 Mar 2024 15:30)  HR: 77 (23 Mar 2024 16:37) (71 - 120)  BP: 109/45 (23 Mar 2024 16:37) (57/39 - 146/130)  BP(mean): 63 (23 Mar 2024 16:37) (45 - 117)  RR: 16 (23 Mar 2024 16:37) (12 - 24)  SpO2: 95% (23 Mar 2024 16:37) (94% - 98%)    Parameters below as of 23 Mar 2024 16:37  Patient On (Oxygen Delivery Method): room air        LABS:                          13.1   15.32 )-----------( 304      ( 23 Mar 2024 10:48 )             37.4       03-23    147<H>  |  109<H>  |  40<H>  ----------------------------<  147<H>  3.0<L>   |  16<L>  |  1.11    Ca    8.3<L>      23 Mar 2024 13:51  Phos  3.8     03-23  Mg     2.6     03-23    TPro  6.7  /  Alb  3.9  /  TBili  1.2  /  DBili  x   /  AST  96<H>  /  ALT  39  /  AlkPhos  90  03-23      CAPILLARY BLOOD GLUCOSE      POCT Blood Glucose.: 153 mg/dL (23 Mar 2024 10:22)      PT/INR - ( 23 Mar 2024 10:48 )   PT: 11.8 sec;   INR: 1.07 ratio         PTT - ( 23 Mar 2024 10:48 )  PTT:27.2 sec    LOWER EXTREMITY PHYSICAL EXAM:    Vascular: DP/PT 0/4, B/L, CFT <3 seconds B/L, Temperature gradient warm to cool, B/L.   Neuro: Epicritic sensation intact to the level of digits, B/L.  Musculoskeletal/Ortho: no pain on palpation, no tenderness of b/l feet, pt unable to ambulate or ROM ankle joint, able to wiggle toes.   Skin: b/l elongated painful fungal toe nails, no open wounds or signs of infection. No ecchymosis, no edema.       RADIOLOGY & ADDITIONAL STUDIES:    < from: Xray Foot AP + Lateral + Oblique, Bilat (03.23.24 @ 13:32) >    ACC: [id number] EXAM:  XR FOOT COMP MIN 3 VIEWS BI   ORDERED BY:  SHERRIE MA DATE:  03/23/2024          INTERPRETATION:  CLINICAL INDICATION: Redness noted on foot, status post   fall.  TECHNIQUE: Bilateral foot radiographs, 3 respectively views.    COMPARISON: None available.    FINDINGS:    Left foot:  Questionable minimally displaced fracture involving the inferior aspect   of the anterior calcaneal articular surface of the talus. Due to   patient's foot positioning, this may be an artifact of overlying joint   spaces. No dislocation. Joint spaces are maintained.    Right foot:  Chronic appearing focus of ossification inferior posterior to the cuboid,   which may represent calcific tendinosis versus old avulsion fracture. No   dislocation. Joint spaces are maintained.      IMPRESSION:  Acute minimally displaced fracture involving the left inferior aspect of   the anterior calcaneal articular surface of the talus although this   finding may be artifactual due to tilting of the foot.    Consider repeat lateral view of the left foot or CT examination of the   left ankle for further evaluation.    --- End of Report ---          KENNETH TRAMMELL MD; Resident Radiologist  This document has been electronically signed.  NAM CHOPRA MD; Attending Interventional Radiologist  This document has been electronically signed. Mar 23 2024  3:16PM    < end of copied text >   Patient is a 78y old  Female who presents with a chief complaint of S/p mechanical fall (23 Mar 2024 17:00)      HPI: Ms. Martinez is a 79 YO woman with PMH of Parkinson's, HTN, who was brought in by EMS after being found down by a neighbor. She reportedly fell on Tuesday per EMS, has been down since then, using bottles to defecate and urinate into. Patient remembers falling, per EMS likely mechanical as patient's home reflects hoarding. She has been eating tinned sardines while down. She notes taking medication, but does not remember which ones nor when she last took them. EMS noted an old bottle of losartan. At this time, patient notes areas of pressure when turned, and thirstiness but otherwise denies complaint      PAST MEDICAL & SURGICAL HISTORY:  H/O Parkinson's disease      HTN (hypertension)          MEDICATIONS  (STANDING):  heparin  Infusion.  Unit(s)/Hr (12 mL/Hr) IV Continuous <Continuous>  norepinephrine Infusion 0.05 MICROgram(s)/kG/Min (6.27 mL/Hr) IV Continuous <Continuous>    MEDICATIONS  (PRN):  heparin   Injectable 5500 Unit(s) IV Push every 6 hours PRN For aPTT less than 40  heparin   Injectable 2500 Unit(s) IV Push every 6 hours PRN For aPTT between 40 - 57      Allergies    No Known Allergies    Intolerances        VITALS:    Vital Signs Last 24 Hrs  T(C): 36.7 (23 Mar 2024 15:30), Max: 36.7 (23 Mar 2024 15:30)  T(F): 98 (23 Mar 2024 15:30), Max: 98 (23 Mar 2024 15:30)  HR: 77 (23 Mar 2024 16:37) (71 - 120)  BP: 109/45 (23 Mar 2024 16:37) (57/39 - 146/130)  BP(mean): 63 (23 Mar 2024 16:37) (45 - 117)  RR: 16 (23 Mar 2024 16:37) (12 - 24)  SpO2: 95% (23 Mar 2024 16:37) (94% - 98%)    Parameters below as of 23 Mar 2024 16:37  Patient On (Oxygen Delivery Method): room air        LABS:                          13.1   15.32 )-----------( 304      ( 23 Mar 2024 10:48 )             37.4       03-23    147<H>  |  109<H>  |  40<H>  ----------------------------<  147<H>  3.0<L>   |  16<L>  |  1.11    Ca    8.3<L>      23 Mar 2024 13:51  Phos  3.8     03-23  Mg     2.6     03-23    TPro  6.7  /  Alb  3.9  /  TBili  1.2  /  DBili  x   /  AST  96<H>  /  ALT  39  /  AlkPhos  90  03-23      CAPILLARY BLOOD GLUCOSE      POCT Blood Glucose.: 153 mg/dL (23 Mar 2024 10:22)      PT/INR - ( 23 Mar 2024 10:48 )   PT: 11.8 sec;   INR: 1.07 ratio         PTT - ( 23 Mar 2024 10:48 )  PTT:27.2 sec    LOWER EXTREMITY PHYSICAL EXAM:    Vascular: DP/PT 1/4, B/L, CFT <3 seconds B/L, Temperature gradient warm to cool, B/L.   Neuro: Epicritic sensation intact to the level of digits, B/L.  Musculoskeletal/Ortho: no pain on palpation, no tenderness of b/l feet, pt unable to ambulate or ROM ankle joint, able to wiggle toes.   Skin: b/l elongated painful fungal toe nails, no open wounds or signs of infection. No ecchymosis, no edema.       RADIOLOGY & ADDITIONAL STUDIES:    < from: Xray Foot AP + Lateral + Oblique, Bilat (03.23.24 @ 13:32) >    ACC: [id number] EXAM:  XR FOOT COMP MIN 3 VIEWS BI   ORDERED BY:  SHERRIE MA DATE:  03/23/2024          INTERPRETATION:  CLINICAL INDICATION: Redness noted on foot, status post   fall.  TECHNIQUE: Bilateral foot radiographs, 3 respectively views.    COMPARISON: None available.    FINDINGS:    Left foot:  Questionable minimally displaced fracture involving the inferior aspect   of the anterior calcaneal articular surface of the talus. Due to   patient's foot positioning, this may be an artifact of overlying joint   spaces. No dislocation. Joint spaces are maintained.    Right foot:  Chronic appearing focus of ossification inferior posterior to the cuboid,   which may represent calcific tendinosis versus old avulsion fracture. No   dislocation. Joint spaces are maintained.      IMPRESSION:  Acute minimally displaced fracture involving the left inferior aspect of   the anterior calcaneal articular surface of the talus although this   finding may be artifactual due to tilting of the foot.    Consider repeat lateral view of the left foot or CT examination of the   left ankle for further evaluation.    --- End of Report ---          KENNETH TRAMMELL MD; Resident Radiologist  This document has been electronically signed.  NAM CHOPRA MD; Attending Interventional Radiologist  This document has been electronically signed. Mar 23 2024  3:16PM    < end of copied text >

## 2024-03-23 NOTE — ED ADULT NURSE REASSESSMENT NOTE - NS ED NURSE REASSESS COMMENT FT1
patient hypotensive to 63/44. MD Nava aware and at bedside to assess. patient baseline mental status. only complaint at this time is "im thirsty". levophed initiated at 0.05 mcg. patient updated on plan of care. comfort and safety maintained.

## 2024-03-23 NOTE — DISCHARGE NOTE PROVIDER - NSFOLLOWUPCLINICS_GEN_ALL_ED_FT
[facility] General Internal Medicine  General Internal Medicine  [address] [place] [address]  Phone: [phone]  Fax:

## 2024-03-23 NOTE — DISCHARGE NOTE PROVIDER - NSDCCPCAREPLAN_GEN_ALL_CORE_FT
PRINCIPAL DISCHARGE DIAGNOSIS  Diagnosis: Severe hypotension  Assessment and Plan of Treatment: You were admitted after fall and found with severe hypotension, which was thought to be due to severe dehydration. This improved with IV fluids. Your BP is much better now.   STOP taking your prior blood pressure medicatins  Continue to hydrate well  Follow up with PMD      SECONDARY DISCHARGE DIAGNOSES  Diagnosis: SIRS without infection with organ dysfunction  Assessment and Plan of Treatment: You were work-ed up for and initially treated with antibiotics given concern for infection - but all infectious work-up was negative. You have done well off of antibiotics. Your blood work has returned to normal.   please follow up with PMD shortly after discharge    Diagnosis: Pulmonary embolism  Assessment and Plan of Treatment: You were found with a chronic blood clot in your left leg and small clots in your lung, without any effects on your heart or your breathing. You were started on blood thinners.   Please continue to take Eliquis for anticoagulation - 10mg twice daily for 4 more days, then transition to 5mg twice daily after.   The cause of your blood clots is unclear - please follow up with your PMD for hypercoagulabbility work-up and cancer screening.    Diagnosis: Demand ischemia of myocardium  Assessment and Plan of Treatment:      PRINCIPAL DISCHARGE DIAGNOSIS  Diagnosis: Severe hypotension  Assessment and Plan of Treatment: You were admitted after fall and found with severe hypotension, which was thought to be due to severe dehydration. This improved with IV fluids. Your BP is much better now.   STOP taking your prior blood pressure medicatins  Continue to hydrate well  Follow up with PMD      SECONDARY DISCHARGE DIAGNOSES  Diagnosis: SIRS without infection with organ dysfunction  Assessment and Plan of Treatment: You were work-ed up for and initially treated with antibiotics given concern for infection - but all infectious work-up was negative. You have done well off of antibiotics. Your blood work has returned to normal.   please follow up with PMD shortly after discharge    Diagnosis: Pulmonary embolism  Assessment and Plan of Treatment: You were found with a chronic blood clot in your left leg and small clots in your lung, without any effects on your heart or your breathing. You were started on blood thinners.   Please continue to take Eliquis for anticoagulation - 10mg twice daily for 4 more days, then transition to 5mg twice daily after.   The cause of your blood clots is unclear - please follow up with your PMD for hypercoagulabbility work-up and cancer screening.  For severe SOB, for any kind of bleeding, or if pt were to sustain falls, please seek immediate medical care.    Diagnosis: Demand ischemia of myocardium  Assessment and Plan of Treatment: You had some elevated heart enzymes, likely due to demand on your heart from the hypotension. You had an ultrasound of your heart showing normal functioning.  Please follow-up with your PMD for further cardiac work-up as needed.

## 2024-03-23 NOTE — ED PROVIDER NOTE - INTERNATIONAL TRAVEL
Continue ibuprofen and Tylenol as needed for menstrual cramping.  May use ondansetron as needed for nausea vomiting.  Drink plenty of fluids stay well-hydrated.  You will need to follow-up with your OB/GYN in the next few days to reevaluate symptoms and ensure they are improving.  Return to the ER for any change, worsening symptoms, or any additional concerns include but limited to severe worsening abdominal pain, fever greater than >100.4, intractable vomiting.  
No

## 2024-03-23 NOTE — ED PROVIDER NOTE - OBJECTIVE STATEMENT
PCP: Naveen Cool    Ms. Martinez is a 77 YO woman with PMH of Parkinson's, HTN, who was brought in by EMS after being found down by a neighbor. She reportedly fell on Tuesday per EMS, has been down since then, using bottles to defecate and urinate into. Patient remembers falling, per EMS likely mechanical as patient's home reflects hoarding. She has been eating tinned sardines while down. She notes taking medication, but does not remember which ones nor when she last took them. EMS noted an old bottle of losartan. At this time, patient notes areas of pressure when turned, and thirstiness but otherwise denies complaints.

## 2024-03-23 NOTE — DISCHARGE NOTE PROVIDER - PROVIDER TOKENS
FREE:[LAST:[General Internal Medicine],PHONE:[phone]],FAX:[(   [phone]    -],ADDRESS:[[address] [place] [address]]] PROVIDER:[TOKEN:[[id number]]],PROVIDER:[TOKEN:[[id number]]]

## 2024-03-23 NOTE — CHART NOTE - NSCHARTNOTEFT_GEN_A_CORE
Pt seen and examined at bedside, Levophed gtt @ 0.8 with SBPs in 180s. Given improvement in SBP, Levophed gtt weaned. On POCUS, pt has hyperdynamic LV Pt seen and examined at bedside, Levophed gtt @ 0.8 with SBPs in 180s. Given improvement in SBP, Levophed gtt weaned. On POCUS, pt has dilated LV, RA collapsed, IVC 2.5 cm with < 50% inspiratory collapse.     Recommendations:  -trial 250cc of 5% albumin  -will reassess patient Pt seen and examined at bedside, Levophed gtt @ 0.8 with SBPs in 180s. Given improvement in SBP, Levophed gtt weaned. On POCUS, pt has dilated LV, RA collapsed, IVC 2.5 cm with < 50% inspiratory collapse.     Recommendations:  -trial 250cc of 5% albumin  -please check FSG, would recheck VBG w/ lactate  -will reassess patient

## 2024-03-23 NOTE — ED PROVIDER NOTE - CLINICAL SUMMARY MEDICAL DECISION MAKING FREE TEXT BOX
Ms. Martinez is a 79 YO woman with PMH of Parkinson's, HTN, who was brought in by EMS after being found down by a neighbor. Vitals concerning for hypothermia for which patient started on re-warming measures including warmed IV fluids, warming blanket, hot packs. Will continue to monitor temperature and send workup for thyroid/adrenal dysfunction. Physical exam concerning for debility with pressure ulcers, will obtain rhabdomyolysis workup, EKG, monitor electrolytes. Tenderness in abdomen, iso fall will obtain pan-imaging. R foot with some redness, poor grooming, will obtain XR feet. Continue to monitor as rewarming occurs. Vitals also concerning for sepsis, will start fluids and empiric antibiotics.

## 2024-03-24 ENCOUNTER — RESULT REVIEW (OUTPATIENT)
Age: 79
End: 2024-03-24

## 2024-03-24 DIAGNOSIS — I26.99 OTHER PULMONARY EMBOLISM WITHOUT ACUTE COR PULMONALE: ICD-10-CM

## 2024-03-24 DIAGNOSIS — E87.20 ACIDOSIS, UNSPECIFIED: ICD-10-CM

## 2024-03-24 DIAGNOSIS — Z86.69 PERSONAL HISTORY OF OTHER DISEASES OF THE NERVOUS SYSTEM AND SENSE ORGANS: ICD-10-CM

## 2024-03-24 DIAGNOSIS — R09.89 OTHER SPECIFIED SYMPTOMS AND SIGNS INVOLVING THE CIRCULATORY AND RESPIRATORY SYSTEMS: ICD-10-CM

## 2024-03-24 DIAGNOSIS — Z29.9 ENCOUNTER FOR PROPHYLACTIC MEASURES, UNSPECIFIED: ICD-10-CM

## 2024-03-24 DIAGNOSIS — M62.82 RHABDOMYOLYSIS: ICD-10-CM

## 2024-03-24 DIAGNOSIS — I48.91 UNSPECIFIED ATRIAL FIBRILLATION: ICD-10-CM

## 2024-03-24 DIAGNOSIS — R68.89 OTHER GENERAL SYMPTOMS AND SIGNS: ICD-10-CM

## 2024-03-24 DIAGNOSIS — K66.8 OTHER SPECIFIED DISORDERS OF PERITONEUM: ICD-10-CM

## 2024-03-24 DIAGNOSIS — S92.109A UNSPECIFIED FRACTURE OF UNSPECIFIED TALUS, INITIAL ENCOUNTER FOR CLOSED FRACTURE: ICD-10-CM

## 2024-03-24 DIAGNOSIS — E87.0 HYPEROSMOLALITY AND HYPERNATREMIA: ICD-10-CM

## 2024-03-24 DIAGNOSIS — A41.9 SEPSIS, UNSPECIFIED ORGANISM: ICD-10-CM

## 2024-03-24 DIAGNOSIS — I24.89 OTHER FORMS OF ACUTE ISCHEMIC HEART DISEASE: ICD-10-CM

## 2024-03-24 LAB
A1C WITH ESTIMATED AVERAGE GLUCOSE RESULT: 5.2 % — SIGNIFICANT CHANGE UP (ref 4–5.6)
ADD ON TEST-SPECIMEN IN LAB: SIGNIFICANT CHANGE UP
ALBUMIN SERPL ELPH-MCNC: 3.3 G/DL — SIGNIFICANT CHANGE UP (ref 3.3–5)
ALBUMIN SERPL ELPH-MCNC: 3.7 G/DL — SIGNIFICANT CHANGE UP (ref 3.3–5)
ALP SERPL-CCNC: 60 U/L — SIGNIFICANT CHANGE UP (ref 40–120)
ALP SERPL-CCNC: 76 U/L — SIGNIFICANT CHANGE UP (ref 40–120)
ALT FLD-CCNC: 33 U/L — SIGNIFICANT CHANGE UP (ref 10–45)
ALT FLD-CCNC: 40 U/L — SIGNIFICANT CHANGE UP (ref 10–45)
ANION GAP SERPL CALC-SCNC: 17 MMOL/L — SIGNIFICANT CHANGE UP (ref 5–17)
ANION GAP SERPL CALC-SCNC: 21 MMOL/L — HIGH (ref 5–17)
APTT BLD: 176.6 SEC — CRITICAL HIGH (ref 24.5–35.6)
APTT BLD: 93.3 SEC — HIGH (ref 24.5–35.6)
APTT BLD: >200 SEC — CRITICAL HIGH (ref 24.5–35.6)
AST SERPL-CCNC: 71 U/L — HIGH (ref 10–40)
AST SERPL-CCNC: 89 U/L — HIGH (ref 10–40)
B-OH-BUTYR SERPL-SCNC: 1.2 MMOL/L — HIGH
BASE EXCESS BLDV CALC-SCNC: 0.8 MMOL/L — SIGNIFICANT CHANGE UP (ref -2–3)
BASOPHILS # BLD AUTO: 0 K/UL — SIGNIFICANT CHANGE UP (ref 0–0.2)
BASOPHILS # BLD AUTO: 0.01 K/UL — SIGNIFICANT CHANGE UP (ref 0–0.2)
BASOPHILS NFR BLD AUTO: 0 % — SIGNIFICANT CHANGE UP (ref 0–2)
BASOPHILS NFR BLD AUTO: 0.1 % — SIGNIFICANT CHANGE UP (ref 0–2)
BILIRUB SERPL-MCNC: 0.7 MG/DL — SIGNIFICANT CHANGE UP (ref 0.2–1.2)
BILIRUB SERPL-MCNC: 1 MG/DL — SIGNIFICANT CHANGE UP (ref 0.2–1.2)
BUN SERPL-MCNC: 26 MG/DL — HIGH (ref 7–23)
BUN SERPL-MCNC: 32 MG/DL — HIGH (ref 7–23)
BURR CELLS BLD QL SMEAR: PRESENT — SIGNIFICANT CHANGE UP
CA-I SERPL-SCNC: 1.22 MMOL/L — SIGNIFICANT CHANGE UP (ref 1.15–1.33)
CALCIUM SERPL-MCNC: 8.7 MG/DL — SIGNIFICANT CHANGE UP (ref 8.4–10.5)
CALCIUM SERPL-MCNC: 9 MG/DL — SIGNIFICANT CHANGE UP (ref 8.4–10.5)
CHLORIDE BLDV-SCNC: 113 MMOL/L — HIGH (ref 96–108)
CHLORIDE SERPL-SCNC: 111 MMOL/L — HIGH (ref 96–108)
CHLORIDE SERPL-SCNC: 113 MMOL/L — HIGH (ref 96–108)
CK SERPL-CCNC: 1030 U/L — HIGH (ref 25–170)
CK SERPL-CCNC: 1282 U/L — HIGH (ref 25–170)
CO2 BLDV-SCNC: 26 MMOL/L — SIGNIFICANT CHANGE UP (ref 22–26)
CO2 SERPL-SCNC: 18 MMOL/L — LOW (ref 22–31)
CO2 SERPL-SCNC: 18 MMOL/L — LOW (ref 22–31)
CREAT SERPL-MCNC: 1.1 MG/DL — SIGNIFICANT CHANGE UP (ref 0.5–1.3)
CREAT SERPL-MCNC: 1.23 MG/DL — SIGNIFICANT CHANGE UP (ref 0.5–1.3)
CULTURE RESULTS: SIGNIFICANT CHANGE UP
EGFR: 45 ML/MIN/1.73M2 — LOW
EGFR: 52 ML/MIN/1.73M2 — LOW
EOSINOPHIL # BLD AUTO: 0 K/UL — SIGNIFICANT CHANGE UP (ref 0–0.5)
EOSINOPHIL # BLD AUTO: 0 K/UL — SIGNIFICANT CHANGE UP (ref 0–0.5)
EOSINOPHIL NFR BLD AUTO: 0 % — SIGNIFICANT CHANGE UP (ref 0–6)
EOSINOPHIL NFR BLD AUTO: 0 % — SIGNIFICANT CHANGE UP (ref 0–6)
ESTIMATED AVERAGE GLUCOSE: 103 MG/DL — SIGNIFICANT CHANGE UP (ref 68–114)
GAS PNL BLDV: 144 MMOL/L — SIGNIFICANT CHANGE UP (ref 136–145)
GAS PNL BLDV: SIGNIFICANT CHANGE UP
GAS PNL BLDV: SIGNIFICANT CHANGE UP
GLUCOSE BLDV-MCNC: 224 MG/DL — HIGH (ref 70–99)
GLUCOSE SERPL-MCNC: 200 MG/DL — HIGH (ref 70–99)
GLUCOSE SERPL-MCNC: 230 MG/DL — HIGH (ref 70–99)
HCO3 BLDV-SCNC: 25 MMOL/L — SIGNIFICANT CHANGE UP (ref 22–29)
HCT VFR BLD CALC: 29 % — LOW (ref 34.5–45)
HCT VFR BLD CALC: 32.5 % — LOW (ref 34.5–45)
HCT VFR BLDA CALC: 31 % — LOW (ref 34.5–46.5)
HCV AB S/CO SERPL IA: 0.07 S/CO — SIGNIFICANT CHANGE UP (ref 0–0.99)
HCV AB SERPL-IMP: SIGNIFICANT CHANGE UP
HGB BLD CALC-MCNC: 10.3 G/DL — LOW (ref 11.7–16.1)
HGB BLD-MCNC: 10.2 G/DL — LOW (ref 11.5–15.5)
HGB BLD-MCNC: 11.5 G/DL — SIGNIFICANT CHANGE UP (ref 11.5–15.5)
IMM GRANULOCYTES NFR BLD AUTO: 0.7 % — SIGNIFICANT CHANGE UP (ref 0–0.9)
INR BLD: 1.09 RATIO — SIGNIFICANT CHANGE UP (ref 0.85–1.18)
INR BLD: 1.23 RATIO — HIGH (ref 0.85–1.18)
LACTATE BLDV-MCNC: 1.6 MMOL/L — SIGNIFICANT CHANGE UP (ref 0.5–2)
LACTATE BLDV-MCNC: 1.9 MMOL/L — SIGNIFICANT CHANGE UP (ref 0.5–2)
LYMPHOCYTES # BLD AUTO: 0.28 K/UL — LOW (ref 1–3.3)
LYMPHOCYTES # BLD AUTO: 0.58 K/UL — LOW (ref 1–3.3)
LYMPHOCYTES # BLD AUTO: 1.7 % — LOW (ref 13–44)
LYMPHOCYTES # BLD AUTO: 4 % — LOW (ref 13–44)
MAGNESIUM SERPL-MCNC: 2.1 MG/DL — SIGNIFICANT CHANGE UP (ref 1.6–2.6)
MAGNESIUM SERPL-MCNC: 2.3 MG/DL — SIGNIFICANT CHANGE UP (ref 1.6–2.6)
MANUAL SMEAR VERIFICATION: SIGNIFICANT CHANGE UP
MCHC RBC-ENTMCNC: 30.1 PG — SIGNIFICANT CHANGE UP (ref 27–34)
MCHC RBC-ENTMCNC: 30.3 PG — SIGNIFICANT CHANGE UP (ref 27–34)
MCHC RBC-ENTMCNC: 35.2 GM/DL — SIGNIFICANT CHANGE UP (ref 32–36)
MCHC RBC-ENTMCNC: 35.4 GM/DL — SIGNIFICANT CHANGE UP (ref 32–36)
MCV RBC AUTO: 85.5 FL — SIGNIFICANT CHANGE UP (ref 80–100)
MCV RBC AUTO: 85.8 FL — SIGNIFICANT CHANGE UP (ref 80–100)
MONOCYTES # BLD AUTO: 0.15 K/UL — SIGNIFICANT CHANGE UP (ref 0–0.9)
MONOCYTES # BLD AUTO: 0.83 K/UL — SIGNIFICANT CHANGE UP (ref 0–0.9)
MONOCYTES NFR BLD AUTO: 0.9 % — LOW (ref 2–14)
MONOCYTES NFR BLD AUTO: 5.7 % — SIGNIFICANT CHANGE UP (ref 2–14)
NEUTROPHILS # BLD AUTO: 13.05 K/UL — HIGH (ref 1.8–7.4)
NEUTROPHILS # BLD AUTO: 16.08 K/UL — HIGH (ref 1.8–7.4)
NEUTROPHILS NFR BLD AUTO: 89.5 % — HIGH (ref 43–77)
NEUTROPHILS NFR BLD AUTO: 97.4 % — HIGH (ref 43–77)
NRBC # BLD: 0 /100 WBCS — SIGNIFICANT CHANGE UP (ref 0–0)
PCO2 BLDV: 39 MMHG — SIGNIFICANT CHANGE UP (ref 39–42)
PH BLDV: 7.42 — SIGNIFICANT CHANGE UP (ref 7.32–7.43)
PHOSPHATE SERPL-MCNC: 3.7 MG/DL — SIGNIFICANT CHANGE UP (ref 2.5–4.5)
PHOSPHATE SERPL-MCNC: 5.1 MG/DL — HIGH (ref 2.5–4.5)
PLAT MORPH BLD: NORMAL — SIGNIFICANT CHANGE UP
PLATELET # BLD AUTO: 270 K/UL — SIGNIFICANT CHANGE UP (ref 150–400)
PLATELET # BLD AUTO: 312 K/UL — SIGNIFICANT CHANGE UP (ref 150–400)
PO2 BLDV: 47 MMHG — HIGH (ref 25–45)
POIKILOCYTOSIS BLD QL AUTO: SLIGHT — SIGNIFICANT CHANGE UP
POTASSIUM BLDV-SCNC: 3.6 MMOL/L — SIGNIFICANT CHANGE UP (ref 3.5–5.1)
POTASSIUM SERPL-MCNC: 3.6 MMOL/L — SIGNIFICANT CHANGE UP (ref 3.5–5.3)
POTASSIUM SERPL-MCNC: 3.8 MMOL/L — SIGNIFICANT CHANGE UP (ref 3.5–5.3)
POTASSIUM SERPL-SCNC: 3.6 MMOL/L — SIGNIFICANT CHANGE UP (ref 3.5–5.3)
POTASSIUM SERPL-SCNC: 3.8 MMOL/L — SIGNIFICANT CHANGE UP (ref 3.5–5.3)
PROCALCITONIN SERPL-MCNC: 0.1 NG/ML — SIGNIFICANT CHANGE UP (ref 0.02–0.1)
PROCALCITONIN SERPL-MCNC: 0.12 NG/ML — HIGH (ref 0.02–0.1)
PROT SERPL-MCNC: 5.3 G/DL — LOW (ref 6–8.3)
PROT SERPL-MCNC: 6.2 G/DL — SIGNIFICANT CHANGE UP (ref 6–8.3)
PROTHROM AB SERPL-ACNC: 12 SEC — SIGNIFICANT CHANGE UP (ref 9.5–13)
PROTHROM AB SERPL-ACNC: 12.8 SEC — SIGNIFICANT CHANGE UP (ref 9.5–13)
RBC # BLD: 3.39 M/UL — LOW (ref 3.8–5.2)
RBC # BLD: 3.79 M/UL — LOW (ref 3.8–5.2)
RBC # FLD: 13.5 % — SIGNIFICANT CHANGE UP (ref 10.3–14.5)
RBC # FLD: 13.6 % — SIGNIFICANT CHANGE UP (ref 10.3–14.5)
RBC BLD AUTO: ABNORMAL
SAO2 % BLDV: 80 % — SIGNIFICANT CHANGE UP (ref 67–88)
SCHISTOCYTES BLD QL AUTO: SLIGHT — SIGNIFICANT CHANGE UP
SODIUM SERPL-SCNC: 148 MMOL/L — HIGH (ref 135–145)
SODIUM SERPL-SCNC: 150 MMOL/L — HIGH (ref 135–145)
SPECIMEN SOURCE: SIGNIFICANT CHANGE UP
TARGETS BLD QL SMEAR: SLIGHT — SIGNIFICANT CHANGE UP
TROPONIN T, HIGH SENSITIVITY RESULT: 137 NG/L — HIGH (ref 0–51)
TROPONIN T, HIGH SENSITIVITY RESULT: 153 NG/L — HIGH (ref 0–51)
WBC # BLD: 14.57 K/UL — HIGH (ref 3.8–10.5)
WBC # BLD: 16.51 K/UL — HIGH (ref 3.8–10.5)
WBC # FLD AUTO: 14.57 K/UL — HIGH (ref 3.8–10.5)
WBC # FLD AUTO: 16.51 K/UL — HIGH (ref 3.8–10.5)

## 2024-03-24 PROCEDURE — 76377 3D RENDER W/INTRP POSTPROCES: CPT | Mod: 26

## 2024-03-24 PROCEDURE — 93010 ELECTROCARDIOGRAM REPORT: CPT

## 2024-03-24 PROCEDURE — 93306 TTE W/DOPPLER COMPLETE: CPT | Mod: 26

## 2024-03-24 PROCEDURE — 73700 CT LOWER EXTREMITY W/O DYE: CPT | Mod: 26,LT

## 2024-03-24 PROCEDURE — 99221 1ST HOSP IP/OBS SF/LOW 40: CPT

## 2024-03-24 PROCEDURE — 93356 MYOCRD STRAIN IMG SPCKL TRCK: CPT

## 2024-03-24 PROCEDURE — 76604 US EXAM CHEST: CPT | Mod: 26

## 2024-03-24 PROCEDURE — 99233 SBSQ HOSP IP/OBS HIGH 50: CPT | Mod: GC,25

## 2024-03-24 RX ORDER — CEFTRIAXONE 500 MG/1
1000 INJECTION, POWDER, FOR SOLUTION INTRAMUSCULAR; INTRAVENOUS EVERY 24 HOURS
Refills: 0 | Status: DISCONTINUED | OUTPATIENT
Start: 2024-03-24 | End: 2024-03-26

## 2024-03-24 RX ORDER — INFLUENZA VIRUS VACCINE 15; 15; 15; 15 UG/.5ML; UG/.5ML; UG/.5ML; UG/.5ML
0.7 SUSPENSION INTRAMUSCULAR ONCE
Refills: 0 | Status: DISCONTINUED | OUTPATIENT
Start: 2024-03-24 | End: 2024-03-29

## 2024-03-24 RX ORDER — AZITHROMYCIN 500 MG/1
500 TABLET, FILM COATED ORAL ONCE
Refills: 0 | Status: COMPLETED | OUTPATIENT
Start: 2024-03-24 | End: 2024-03-24

## 2024-03-24 RX ORDER — AZITHROMYCIN 500 MG/1
TABLET, FILM COATED ORAL
Refills: 0 | Status: DISCONTINUED | OUTPATIENT
Start: 2024-03-24 | End: 2024-03-26

## 2024-03-24 RX ORDER — AZITHROMYCIN 500 MG/1
500 TABLET, FILM COATED ORAL EVERY 24 HOURS
Refills: 0 | Status: DISCONTINUED | OUTPATIENT
Start: 2024-03-25 | End: 2024-03-26

## 2024-03-24 RX ORDER — POTASSIUM CHLORIDE 20 MEQ
40 PACKET (EA) ORAL ONCE
Refills: 0 | Status: COMPLETED | OUTPATIENT
Start: 2024-03-24 | End: 2024-03-24

## 2024-03-24 RX ORDER — SODIUM CHLORIDE 9 MG/ML
500 INJECTION, SOLUTION INTRAVENOUS ONCE
Refills: 0 | Status: COMPLETED | OUTPATIENT
Start: 2024-03-24 | End: 2024-03-24

## 2024-03-24 RX ORDER — SODIUM CHLORIDE 9 MG/ML
1000 INJECTION, SOLUTION INTRAVENOUS
Refills: 0 | Status: DISCONTINUED | OUTPATIENT
Start: 2024-03-24 | End: 2024-03-26

## 2024-03-24 RX ORDER — HEPARIN SODIUM 5000 [USP'U]/ML
1500 INJECTION INTRAVENOUS; SUBCUTANEOUS
Qty: 25000 | Refills: 0 | Status: DISCONTINUED | OUTPATIENT
Start: 2024-03-24 | End: 2024-03-26

## 2024-03-24 RX ADMIN — SODIUM CHLORIDE 500 MILLILITER(S): 9 INJECTION, SOLUTION INTRAVENOUS at 14:51

## 2024-03-24 RX ADMIN — HEPARIN SODIUM 1100 UNIT(S)/HR: 5000 INJECTION INTRAVENOUS; SUBCUTANEOUS at 19:34

## 2024-03-24 RX ADMIN — SODIUM CHLORIDE 100 MILLILITER(S): 9 INJECTION, SOLUTION INTRAVENOUS at 06:20

## 2024-03-24 RX ADMIN — HEPARIN SODIUM 0 UNIT(S)/HR: 5000 INJECTION INTRAVENOUS; SUBCUTANEOUS at 19:21

## 2024-03-24 RX ADMIN — HEPARIN SODIUM 1300 UNIT(S)/HR: 5000 INJECTION INTRAVENOUS; SUBCUTANEOUS at 12:00

## 2024-03-24 RX ADMIN — Medication 40 MILLIEQUIVALENT(S): at 12:00

## 2024-03-24 RX ADMIN — HEPARIN SODIUM 1500 UNIT(S)/HR: 5000 INJECTION INTRAVENOUS; SUBCUTANEOUS at 06:21

## 2024-03-24 RX ADMIN — HEPARIN SODIUM 0 UNIT(S)/HR: 5000 INJECTION INTRAVENOUS; SUBCUTANEOUS at 10:46

## 2024-03-24 RX ADMIN — AZITHROMYCIN 255 MILLIGRAM(S): 500 TABLET, FILM COATED ORAL at 10:22

## 2024-03-24 RX ADMIN — HEPARIN SODIUM 0 UNIT(S)/HR: 5000 INJECTION INTRAVENOUS; SUBCUTANEOUS at 18:27

## 2024-03-24 RX ADMIN — HEPARIN SODIUM 1300 UNIT(S)/HR: 5000 INJECTION INTRAVENOUS; SUBCUTANEOUS at 15:50

## 2024-03-24 RX ADMIN — CEFTRIAXONE 100 MILLIGRAM(S): 500 INJECTION, POWDER, FOR SOLUTION INTRAMUSCULAR; INTRAVENOUS at 10:23

## 2024-03-24 NOTE — H&P ADULT - ASSESSMENT
79 y/o F with PMH of Parkinson's disease, HTN, initially brought in by EMS after being found down on the floor by a neighbor. Pt found to be hypothermic, hypotensive, and dehydrated, transferred to MICU for further management of shock, likely septic, requiring levophed gtt.        NEUROLOGY  #    PULMONARY  #    CARDIOVASCULAR  #    RENAL  #    GASTROINTESTINAL  #    INFECTIOUS DISEASE  #    ENDOCRINE  #    HEME  #    FLUIDS/ELECTROLYTES/NUTRITION  # Diet:  # IVF:  # Monitor, Replete to K>4 and Mg>2  # Transfuse for Hgb <7, Plt<10      PROPHYLAXIS  # DVT:  # GI:    DISPO:  CODE STATUS: 79 y/o F with PMH of Parkinson's disease, HTN, initially brought in by EMS after being found down on the floor by a neighbor. Pt found to be hypothermic, hypotensive, and dehydrated, transferred to MICU for further management of shock, likely hypovolemic vs septic, requiring levophed gtt.      NEUROLOGY  #AMS  -likely i/s/o dehydration vs sepsis, pt initially ~AAOx2 but improved back to baseline AAOx4 after IVF resuscitation      #Parkinson's disease  -pt denies being on medications for Parkinson's notes difficulty to walk  -f/u collateral with PCP    PULMONARY  No active issues, pt satting well on RA.    CARDIOVASCULAR  #Shock, likely hypovolemic vs septic  -Pt initially hypotensive, likely hypovolemic vs septic, required levophed gtt in ED. Received 3L of IVF and 250cc albumin bolus, also started on D5 + LR 125cc/hr.  -Weaned off of levophed once pt arrived to MICU  -continue to monitor BP, ensure pt is adequately hydrated.    #PE  Pt with filling defect suggesting small segmental PE on CTA Chest  -c/w heparin gtt    RENAL  #Hypernatremia  -Na 147 -> 150, free water deficit 2L  -c/w D5+LR @ 100cc/hr for now  -monitor I/Os, pt has goodrich    #SILVANO vs CKD  -Pt with eGFR 40s-50s, unclear baseline SCr  -Trend SCr, avoid nephrotoxins    GASTROINTESTINAL  No active issues    INFECTIOUS DISEASE  #Sepsis, possible septic shock  Pt hypothermic to 88F in ED, WBC 15-16, tachycardic to 110s, lactate 2.8 initially. Unclear source, UA negative, Flu/COVID/RSV negative. CT C/A/P also negative for infectious etiology, s/p vanc and zosyn in ED.  -check procal  -trend WBC and fever curve, unclear source. monitor off abx for now.    ENDOCRINE  #HAGMA    HEME  #    FLUIDS/ELECTROLYTES/NUTRITION  # Diet:  # IVF:  # Monitor, Replete to K>4 and Mg>2  # Transfuse for Hgb <7, Plt<10      PROPHYLAXIS  # DVT:  # GI:    DISPO:  CODE STATUS: 77 y/o F with PMH of Parkinson's disease, HTN, initially brought in by EMS after being found down on the floor by a neighbor. Pt found to be hypothermic, hypotensive, and dehydrated, transferred to MICU for further management of shock, likely hypovolemic vs septic, requiring levophed gtt.      NEUROLOGY  #AMS  -likely i/s/o dehydration vs sepsis, pt initially ~AAOx2 but improved back to baseline AAOx4 after IVF resuscitation      #Parkinson's disease  -pt denies being on medications for Parkinson's notes difficulty to walk  -f/u collateral with PCP    PULMONARY  No active issues, pt satting well on RA.    CARDIOVASCULAR  #Shock, likely hypovolemic vs septic  -Pt initially hypotensive, likely hypovolemic vs septic, required levophed gtt in ED. Received 3L of IVF and 250cc albumin bolus, also started on D5 + LR 125cc/hr.  -Weaned off of levophed once pt arrived to MICU  -continue to monitor BP, ensure pt is adequately hydrated.    #PE  Pt with filling defect suggesting small segmental PE on CTA Chest  -c/w heparin gtt    RENAL  #Hypernatremia  -Na 147 -> 150, free water deficit 2L  -c/w D5+LR @ 100cc/hr for now  -monitor I/Os, pt has goodrich    #SILVANO vs CKD  -Pt with eGFR 40s-50s, unclear baseline SCr  -Trend SCr, avoid nephrotoxins    GASTROINTESTINAL  No active issues    INFECTIOUS DISEASE  #Sepsis, possible septic shock  Pt hypothermic to 88F in ED, WBC 15-16, tachycardic to 110s, lactate 2.8 initially. Unclear source, UA negative, Flu/COVID/RSV negative. CT C/A/P also negative for infectious etiology, s/p vanc and zosyn in ED.  -check procal  -trend WBC and fever curve, unclear source. monitor off abx for now.    ENDOCRINE  #HAGMA, likely 2/2 ketosis  Suspect starvation ketosis as pt denies being on SGLT2i, denies hx of T2DM. Pt down on floor with poor PO intake. BHB 5.4 -> 4.8.  -check A1C, trend BHB  -verify med rec in AM with pt's PCP  -start diet on pt, c/w D5+LR, start ISS    HEME  No active issues    FLUIDS/ELECTROLYTES/NUTRITION  # Diet: DASH  # IVF: D5/LR  # Monitor, Replete to K>4 and Mg>2  # Transfuse for Hgb <7, Plt<10      PROPHYLAXIS  # DVT: heparin gtt    CODE STATUS: full code 79 y/o F with PMH of Parkinson's disease, HTN, initially brought in by EMS after being found down on the floor by a neighbor. Pt found to be hypothermic, hypotensive, and dehydrated, transferred to MICU for further management of shock, likely hypovolemic vs septic, requiring levophed gtt.      NEUROLOGY  #AMS  -likely i/s/o dehydration vs sepsis, pt initially ~AAOx2 but improved back to baseline AAOx4 after IVF resuscitation      #Parkinson's disease  -pt denies being on medications for Parkinson's notes difficulty to walk  -f/u collateral with PCP    PULMONARY  No active issues, pt satting well on RA.    CARDIOVASCULAR  #Shock, likely hypovolemic vs septic  -Pt initially hypotensive, likely hypovolemic vs septic, required levophed gtt in ED. Received 3L of IVF and 250cc albumin bolus, also started on D5 + LR 125cc/hr.  -Weaned off of levophed once pt arrived to MICU  -continue to monitor BP, ensure pt is adequately hydrated.    #PE  Pt with filling defect suggesting small segmental PE on CTA Chest  -c/w heparin gtt    #Elevated troponins  Troponin 68 -> 67 -> 85  -EKG, V2-V4 with TWI, STD V3-V4 ~1mm, no RUFINA, pt without CP or SOB, asymptomatic. Denied hx of CAD, reports she was supposed to have stress test done at some point but did not pursue  -Trend to peak, pt asymptomatic with no CP.    RENAL  #Hypernatremia  -Na 147 -> 150, free water deficit 2L  -c/w D5+LR @ 100cc/hr for now  -monitor I/Os, pt has goodrich    #SILVANO vs CKD  -Pt with eGFR 40s-50s, unclear baseline SCr  -Trend SCr, avoid nephrotoxins    GASTROINTESTINAL  No active issues    INFECTIOUS DISEASE  #Sepsis, possible septic shock  Pt hypothermic to 88F in ED, WBC 15-16, tachycardic to 110s, lactate 2.8 initially. Unclear source, UA negative, Flu/COVID/RSV negative. CT C/A/P also negative for infectious etiology, s/p vanc and zosyn in ED.  -check procal  -trend WBC and fever curve, unclear source. monitor off abx for now.    ENDOCRINE  #HAGMA, likely 2/2 ketosis  Suspect starvation ketosis as pt denies being on SGLT2i, denies hx of T2DM. Pt down on floor with poor PO intake. BHB 5.4 -> 4.8.  -check A1C, trend BHB  -verify med rec in AM with pt's PCP  -start diet on pt, c/w D5+LR, start ISS    MUSCULOSKELETAL  #Possible LTalus fracture  Xray Foot shows acute minimally displaced fracture involving the L inferior aspect of the anterior calcaneal articular surface of the talus although may be artificial due to tilting of the foot  -seen by podiatry appreciate recs, pt with no pain at site  -CT LLE noncon ordered to r/o fx    HEME  No active issues    FLUIDS/ELECTROLYTES/NUTRITION  # Diet: DASH  # IVF: D5/LR  # Monitor, Replete to K>4 and Mg>2  # Transfuse for Hgb <7, Plt<10      PROPHYLAXIS  # DVT: heparin gtt    CODE STATUS: full code 79 y/o F with PMH of Parkinson's disease, HTN, initially brought in by EMS after being found down on the floor by a neighbor. Pt found to be hypothermic, hypotensive, and dehydrated, transferred to MICU for further management of shock, likely hypovolemic vs septic, requiring levophed gtt.      NEUROLOGY  #AMS  -likely i/s/o dehydration vs sepsis, pt initially ~AAOx2 but improved back to baseline AAOx4 after IVF resuscitation      #Parkinson's disease  -pt denies being on medications for Parkinson's notes difficulty to walk  -f/u collateral with PCP    PULMONARY  No active issues, pt satting well on RA.    CARDIOVASCULAR  #Shock, likely hypovolemic vs septic  -Pt initially hypotensive, likely hypovolemic vs septic, required levophed gtt in ED. Received 3L of IVF and 250cc albumin bolus, also started on D5 + LR 125cc/hr.  -Weaned off of levophed once pt arrived to MICU  -continue to monitor BP, ensure pt is adequately hydrated.    #PE  Pt with filling defect suggesting small segmental PE on CTA Chest  -c/w heparin gtt    #Elevated troponins  Troponin 68 -> 67 -> 85  -EKG, V2-V4 with TWI, STD V3-V4 ~1mm, no RUFINA, pt without CP or SOB, asymptomatic. Denied hx of CAD, reports she was supposed to have stress test done at some point but did not pursue  -Trend to peak, pt asymptomatic with no CP.    RENAL  #HAGMA, likely 2/2 ketosis  Suspect starvation ketosis as pt denies being on SGLT2i, denies hx of T2DM. Pt down on floor with poor PO intake. BHB 5.4 -> 4.8.  -check A1C, trend BHB  -verify med rec in AM with pt's PCP  -start diet on pt, c/w D5+LR, start ISS    #Hypernatremia  -Na 147 -> 150, free water deficit 2L  -c/w D5+LR @ 100cc/hr for now  -monitor I/Os, pt has joleen    #SILVANO vs CKD, Rhabdo/elevated CK  -CK on admission 1565, trend CK  -c/w D5+LR for now, 100cc/hr can increase IVF rate if tolerated  -Pt with eGFR 40s-50s, unclear baseline SCr  -Trend SCr, avoid nephrotoxins    GASTROINTESTINAL  No active issues    INFECTIOUS DISEASE  #Sepsis, possible septic shock  Pt hypothermic to 88F in ED, WBC 15-16, tachycardic to 110s, lactate 2.8 initially. Unclear source, UA negative, Flu/COVID/RSV negative. CT C/A/P also negative for infectious etiology, s/p vanc and zosyn in ED.  -check procal  -trend WBC and fever curve, unclear source. monitor off abx for now.    ENDOCRINE  EKATERINA    MUSCULOSKELETAL  #Possible LTalus fracture  Xray Foot shows acute minimally displaced fracture involving the L inferior aspect of the anterior calcaneal articular surface of the talus although may be artificial due to tilting of the foot  -seen by podiatry appreciate recs, pt with no pain at site  -CT LLE noncon ordered to r/o fx    HEME  No active issues    FLUIDS/ELECTROLYTES/NUTRITION  # Diet: DASH  # IVF: D5/LR  # Monitor, Replete to K>4 and Mg>2  # Transfuse for Hgb <7, Plt<10      PROPHYLAXIS  # DVT: heparin gtt    CODE STATUS: full code 77 y/o F with PMH of Parkinson's disease, HTN, initially brought in by EMS after being found down on the floor by a neighbor. Pt found to be hypothermic, hypotensive, and dehydrated, transferred to MICU for further management of shock, likely hypovolemic vs septic, requiring levophed gtt.      NEUROLOGY  #AMS  -likely i/s/o dehydration vs sepsis, pt initially ~AAOx2 but improved back to baseline AAOx4 after IVF resuscitation      #Parkinson's disease  -pt denies being on medications for Parkinson's notes difficulty to walk  -f/u collateral with PCP    PULMONARY  No active issues, pt satting well on RA.    CARDIOVASCULAR  #Shock, likely hypovolemic vs septic  -Pt initially hypotensive, likely hypovolemic vs septic, required levophed gtt in ED. Received 3L of IVF and 250cc albumin bolus, also started on D5 + LR 125cc/hr.  -Weaned off of levophed once pt arrived to MICU  -continue to monitor BP, ensure pt is adequately hydrated.    #PE  Pt with filling defect suggesting small segmental PE on CTA Chest  -c/w heparin gtt    #Elevated troponins  Troponin 68 -> 67 -> 85  -EKG, V2-V4 with TWI, STD V3-V4 ~1mm, no RUFINA, pt without CP or SOB, asymptomatic. Denied hx of CAD, reports she was supposed to have stress test done at some point but did not pursue  -Trend to peak, pt asymptomatic with no CP.    RENAL  #HAGMA, likely 2/2 ketosis  Suspect starvation ketosis as pt denies being on SGLT2i, denies hx of T2DM. Pt down on floor with poor PO intake. BHB 5.4 -> 4.8.  -check A1C, trend BHB  -verify med rec in AM with pt's PCP  -start diet on pt, c/w D5+LR, start ISS    #Hypernatremia  -Na 147 -> 150, free water deficit 2L  -c/w D5+LR @ 100cc/hr for now  -monitor I/Os, pt has joleen    #SILVANO vs CKD, Rhabdo/elevated CK  -CK on admission 1565, trend CK  -c/w D5+LR for now, 100cc/hr can increase IVF rate if tolerated  -Pt with eGFR 40s-50s, unclear baseline SCr  -Trend SCr, avoid nephrotoxins    GASTROINTESTINAL  No active issues    INFECTIOUS DISEASE  #Sepsis, possible septic shock  Pt hypothermic to 88F in ED, WBC 15-16, tachycardic to 110s, lactate 2.8 initially. Unclear source, UA negative, Flu/COVID/RSV negative. CT C/A/P also negative for infectious etiology, s/p vanc and zosyn in ED.  -check procal  -trend WBC and fever curve, unclear source. monitor off abx for now.  -BCx x 2 and UCx obtained in ED, f/u results    ENDOCRINE  EKATERINA    MUSCULOSKELETAL  #Possible LTalus fracture  Xray Foot shows acute minimally displaced fracture involving the L inferior aspect of the anterior calcaneal articular surface of the talus although may be artificial due to tilting of the foot  -seen by podiatry appreciate recs, pt with no pain at site  -CT LLE noncon ordered to r/o fx    HEME  No active issues    FLUIDS/ELECTROLYTES/NUTRITION  # Diet: DASH  # IVF: D5/LR  # Monitor, Replete to K>4 and Mg>2  # Transfuse for Hgb <7, Plt<10      PROPHYLAXIS  # DVT: heparin gtt    CODE STATUS: full code

## 2024-03-24 NOTE — CONSULT NOTE ADULT - SUBJECTIVE AND OBJECTIVE BOX
CC: r neck free air on CT     HPI:  79 y/o F with PMH of Parkinson's disease, HTN, initially brought in by EMS after being found down on the floor by a neighbor. Pt found to be hypothermic, hypotensive, and dehydrated, transferred to MICU for further management of shock, likely hypovolemic vs septic, requiring levophed gtt. Pt taken off of pressors 15 minutes after arrival to MICU. Pt was started on IVF for volume resuscitation and RUTH for starvation ketosis. Her mental status improved with IVF. On heparin drip for possible new afib and PE. Pt now downgraded to the floor.  ENT called for incidental findings on CT of free air from rubclavian vein to R AJ and right neck. Pt without any discomfort, pain, denies dysphagia, odynophagia, dysphonia, sob, dyspnea, changes in voice or inability to tolerated secretions. Pt deferring any further work up 2/2 she feels fine also states she is tolerating regular diet.       PAST MEDICAL & SURGICAL HISTORY:  H/O Parkinson's disease      HTN (hypertension)        Allergies    No Known Allergies    Intolerances      MEDICATIONS  (STANDING):  azithromycin  IVPB      cefTRIAXone   IVPB 1000 milliGRAM(s) IV Intermittent every 24 hours  dextrose 5% + lactated ringers. 1000 milliLiter(s) (100 mL/Hr) IV Continuous <Continuous>  heparin  Infusion. 1500 Unit(s)/Hr (15 mL/Hr) IV Continuous <Continuous>  influenza  Vaccine (HIGH DOSE) 0.7 milliLiter(s) IntraMuscular once    MEDICATIONS  (PRN):      Social History: no tobacco, no etoh     Family history: Pt denies any sign FHx    ROS:   ENT: all negative except as noted in HPI   CV: denies palpitations  Pulm: denies SOB, cough, hemoptysis  GI: denies change in apetite, indigestion, n/v  : denies pertinent urinary symptoms, urgency  Neuro: denies numbness/tingling, loss of sensation  Psych: denies anxiety  MS: denies muscle weakness, instability  Heme: denies easy bruising or bleeding  Endo: denies heat/cold intolerance, excessive sweating  Vascular: denies LE edema    Vital Signs Last 24 Hrs  T(C): 37.3 (24 Mar 2024 16:00), Max: 37.6 (24 Mar 2024 00:01)  T(F): 99.2 (24 Mar 2024 16:00), Max: 99.7 (24 Mar 2024 09:26)  HR: 89 (24 Mar 2024 16:00) (69 - 111)  BP: 97/52 (24 Mar 2024 16:00) (85/46 - 168/97)  BP(mean): 69 (24 Mar 2024 15:00) (56 - 122)  RR: 18 (24 Mar 2024 16:00) (12 - 27)  SpO2: 96% (24 Mar 2024 16:00) (88% - 100%)    Parameters below as of 24 Mar 2024 16:00  Patient On (Oxygen Delivery Method): room air                              10.2   14.57 )-----------( 270      ( 24 Mar 2024 09:54 )             29.0    03-24    148<H>  |  113<H>  |  26<H>  ----------------------------<  230<H>  3.6   |  18<L>  |  1.10    Ca    8.7      24 Mar 2024 09:54  Phos  3.7     03-24  Mg     2.1     03-24    TPro  5.3<L>  /  Alb  3.3  /  TBili  0.7  /  DBili  x   /  AST  71<H>  /  ALT  33  /  AlkPhos  60  03-24   PT/INR - ( 24 Mar 2024 09:54 )   PT: 12.8 sec;   INR: 1.23 ratio         PTT - ( 24 Mar 2024 09:54 )  PTT:>200.0 sec    PHYSICAL EXAM:  Gen: NAD  Skin: No rashes, bruises, or lesions  Head: Normocephalic, Atraumatic  Face: no edema, erythema, or fluctuance. Parotid glands soft without mass  Eyes: no scleral injection  Nose: Nares bilaterally patent, no discharge  Mouth: No Stridor / Drooling / Trismus.  Mucosa moist, tongue/uvula midline, oropharynx clear  Neck: Flat, supple, no lymphadenopathy, trachea midline, no masses, no crepitus noted b/l   Lymphatic: No lymphadenopathy  Resp: breathing easily, no stridor  CV: no peripheral edema/cyanosis  GI: nondistended   Peripheral vascular: no JVD or edema  Neuro: facial nerve intact, no facial droop          Fiberoptic Indirect laryngoscopy:  (Scope #2 used) deferred at this time by pt         IMAGING/ADDITIONAL STUDIES: < from: CT Head No Cont (03.23.24 @ 12:19) >  CT CERVICAL SPINE: Age-indeterminate superior T1, T2, and T3 endplate   concavity is likely chronic and degenerative. Correlate with site of pain.    Right-sided intravenous air including the right subclavian and right   internal jugular veins extending into the deep tissues of the neck.   Correlate for instrumentation and iatrogenic causes.    < end of copied text >

## 2024-03-24 NOTE — CHART NOTE - NSCHARTNOTEFT_GEN_A_CORE
MICU Transfer Note    Transfer from: MICU    Transfer to: (  ) Medicine    ( X ) Telemetry     (   ) RCU        (    ) Palliative         (   ) Stroke Unit          (   ) __________________    Accepting Physician:  Signout given to:       HPI: "77 y/o F with PMH of Parkinson's disease, HTN, initially brought in by EMS after being found down on the floor by a neighbor. Per EMS, she fell on Tuesday and was down on the floor since then, was using bottle to urinate and defecate into while eating tinned sardines. Pt reports she has been taking medications but unsure which ones, per EMS there was an old bottle of losartan at her home.     Pt AAOx4, reports that she remembers initial fall that occurred on Tuesday. She was reorganizing some of her things and slipped, fell, and a box fell on her. She felt like she could not get up after because she could not balance herself. Denies head strike LOC, but does not remember EMS entering her apartment and bringing her out on a stretcher. Denies hx of T2DM, heart disease but does report an issue with her heart valve that she was evaluated for outpatient but reports that it was "fine" and that she did not need surgery. Pt reports she only takes valsartan and furosemide at home, does not remember dosages.    In ED, pt received 2L NS bolus and 1L LR bolus, vanc, zosyn, solumedrol 125 mg IV, heparin gtt, later started on levo gtt and given albumin 5% 250cc  CT Head unremarkable, CT C-spine shows age-indeterminate T1, T2, T3 endplate concavity, and R sided intravenous air including R subclavian and IJ veins extending into deep tissues of neck.      CT Chest/A/P: age-indeterminate mild L2 and moderate L3-L4 compression deformities, no fx. Subsegmental pulmonary arterial filling defect suggesting very small peripheral PE. Dilated pulmonary trunk compatible with chronic pulmonary arterial hypertension. No R heart strain."      MICU Course:  Pt taken off of pressors 15 minutes after arrival to MICU. Pt was started on IVF for volume resuscitation and RUTH for starvation ketosis. Her mental status improved with IVF. On heparin drip for possible new afib.    Assessment and Plan:  77 y/o F with PMH of Parkinson's disease, HTN, initially brought in by EMS after being found down on the floor by a neighbor. Pt found to be hypothermic, hypotensive, and dehydrated, transferred to MICU for further management of shock, likely hypovolemic vs septic, requiring levophed gtt.      NEUROLOGY  #AMS  - likely i/s/o dehydration vs sepsis, pt initially ~AAOx2 but improved back to baseline AAOx4 after IVF resuscitation    #Parkinson's disease  - pt denies being on medications for Parkinson's, notes difficulty to walk. Uses a walker at home  - f/u collateral with PCP  - Eventual neuro eval    PULMONARY  #Former smoker  - On RA.    CARDIOVASCULAR  #Shock, likely hypovolemic vs septic  - Pt initially hypotensive, likely hypovolemic vs septic, required levophed gtt in ED. Received 3L of IVF and 250cc albumin bolus, also started on D5 + LR  - Weaned off of levophed once pt arrived to MICU  - Continue to monitor BP, ensure pt is adequately hydrated.  - Hold home valsartan 80 and lasix 40    #PE  Pt with filling defect suggesting small segmental PE on CTA Chest  - c/w heparin gtt  - Check lower extremity duplex    #Elevated troponins  Troponin 68 -> 67 -> 85-->153-->137  -EKG, V2-V4 with TWI, STD V3-V4 ~1mm, no RUFINA, pt without CP or SOB, asymptomatic. Denied hx of CAD, reports she was supposed to have stress test done at some point but did not pursue  -Trend to peak, pt asymptomatic with no CP  - Check TTE    #Afib  - New?, transient afib on initial EKG  - On heparin infusion for PE  - Rates ~100    RENAL  #HAGMA, likely 2/2 ketosis  - Suspect starvation ketosis as pt denies being on SGLT2i, denies hx of T2DM. Pt down on floor with poor PO intake. BHB 5.4 -> 4.8-->1.2  - AG closed  - A1C 5.2, trend BHB  - Start diet on pt, c/w D5+LR, start ISS    #Hypernatremia  -Na 147 -> 150-->148, free water deficit 2L  -c/w D5+LR @ 100cc/hr for now  -monitor I/Os, pt has goodrich    #SILVANO vs CKD, Rhabdo/elevated CK  -CK on admission 1565, downtrending  -c/w D5+LR for now, 100cc/hr can increase IVF rate if tolerated  -Pt with eGFR 40s-50s, unclear baseline SCr  -Trend SCr, avoid nephrotoxins    GASTROINTESTINAL  #Dilated CBD to 8mm  - Check RUQ U/S    INFECTIOUS DISEASE  #Sepsis  - Pt hypothermic to 88F in ED, WBC 15-16, tachycardic to 110s, lactate 2.8 initially. Unclear source  - UA negative, Flu/COVID/RSV negative. CT C/A/P also negative for infectious etiology, s/p vanc and zosyn in ED.  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  - TSH 0.97, free T4 1.1    MUSCULOSKELETAL  #Possible L Talus fracture  - Xray Foot shows acute minimally displaced fracture involving the L inferior aspect of the anterior calcaneal articular surface of the talus although may be artificial due to tilting of the foot  -seen by podiatry appreciate recs, pt with no pain at site  -CT LLE noncon ordered to r/o fx    HEME  No active issues    FLUIDS/ELECTROLYTES/NUTRITION  # Diet: DASH  # IVF: D5/LR      PROPHYLAXIS  # DVT: heparin gtt    CODE STATUS: full code, elects friend Nilam Hoffmann who lives in [place] as her surrogate decision maker.    To Follow Up on:  [ ] TTE   [ ] LE DVT study  [ ] RUQ U/S  [ ] Volume status  [ ] Reach out to PCP for collateral  [ ] Contact Friend Nilam Shun  [ ] CT LLE noncon ordered to r/o talus fracture.  [ ] Monitor for signs of bleeding on heparin drip  [ ] Monitor for persistent afib  [ ] Consider neuro eval for parkinsons MICU Transfer Note    Transfer from: MICU    Transfer to: ( X ) Medicine    (  ) Telemetry     (   ) RCU        (    ) Palliative         (   ) Stroke Unit          (   ) __________________    Accepting Physician: Ana  Signout given to:       HPI: "79 y/o F with PMH of Parkinson's disease, HTN, initially brought in by EMS after being found down on the floor by a neighbor. Per EMS, she fell on Tuesday and was down on the floor since then, was using bottle to urinate and defecate into while eating tinned sardines. Pt reports she has been taking medications but unsure which ones, per EMS there was an old bottle of losartan at her home.     Pt AAOx4, reports that she remembers initial fall that occurred on Tuesday. She was reorganizing some of her things and slipped, fell, and a box fell on her. She felt like she could not get up after because she could not balance herself. Denies head strike LOC, but does not remember EMS entering her apartment and bringing her out on a stretcher. Denies hx of T2DM, heart disease but does report an issue with her heart valve that she was evaluated for outpatient but reports that it was "fine" and that she did not need surgery. Pt reports she only takes valsartan and furosemide at home, does not remember dosages.    In ED, pt received 2L NS bolus and 1L LR bolus, vanc, zosyn, solumedrol 125 mg IV, heparin gtt, later started on levo gtt and given albumin 5% 250cc  CT Head unremarkable, CT C-spine shows age-indeterminate T1, T2, T3 endplate concavity, and R sided intravenous air including R subclavian and IJ veins extending into deep tissues of neck.      CT Chest/A/P: age-indeterminate mild L2 and moderate L3-L4 compression deformities, no fx. Subsegmental pulmonary arterial filling defect suggesting very small peripheral PE. Dilated pulmonary trunk compatible with chronic pulmonary arterial hypertension. No R heart strain."      MICU Course:  Pt taken off of pressors 15 minutes after arrival to MICU. Pt was started on IVF for volume resuscitation and RUTH for starvation ketosis. Her mental status improved with IVF. On heparin drip for possible new afib.    Assessment and Plan:  79 y/o F with PMH of Parkinson's disease, HTN, initially brought in by EMS after being found down on the floor by a neighbor. Pt found to be hypothermic, hypotensive, and dehydrated, transferred to MICU for further management of shock, likely hypovolemic vs septic, requiring levophed gtt.      NEUROLOGY  #AMS  - likely i/s/o dehydration vs sepsis, pt initially ~AAOx2 but improved back to baseline AAOx4 after IVF resuscitation    #Parkinson's disease  - pt denies being on medications for Parkinson's, notes difficulty to walk. Uses a walker at home  - f/u collateral with PCP  - Eventual neuro eval    PULMONARY  #Former smoker  - On RA.    CARDIOVASCULAR  #Shock, likely hypovolemic vs septic  - Pt initially hypotensive, likely hypovolemic vs septic, required levophed gtt in ED. Received 3L of IVF and 250cc albumin bolus, also started on D5 + LR  - Weaned off of levophed once pt arrived to MICU  - Continue to monitor BP, ensure pt is adequately hydrated.  - Hold home valsartan 80 and lasix 40    #PE  Pt with filling defect suggesting small segmental PE on CTA Chest  - c/w heparin gtt  - Check lower extremity duplex    #Elevated troponins  Troponin 68 -> 67 -> 85-->153-->137  -EKG, V2-V4 with TWI, STD V3-V4 ~1mm, no RUFINA, pt without CP or SOB, asymptomatic. Denied hx of CAD, reports she was supposed to have stress test done at some point but did not pursue  -Trend to peak, pt asymptomatic with no CP  - Check TTE    #Afib  - New?, transient afib on initial EKG but in sinus since  - On heparin infusion for PE  - Rates ~100    #Intravenous air  - CT spine with R sided intravenous air including R subclavian and IJ veins extending into deep tissues of neck  - Unclear etiology or significance. Consult vascular surgery    RENAL  #HAGMA, likely 2/2 ketosis  - Suspect starvation ketosis as pt denies being on SGLT2i, denies hx of T2DM. Pt down on floor with poor PO intake. BHB 5.4 -> 4.8-->1.2  - AG closed  - A1C 5.2, trend BHB  - Start diet on pt, c/w D5+LR, start ISS    #Hypernatremia  -Na 147 -> 150-->148, free water deficit 2L  -c/w D5+LR @ 100cc/hr for now  -monitor I/Os, pt has goodrich    #SIVLANO vs CKD, Rhabdo/elevated CK  -CK on admission 1565, downtrending  -c/w D5+LR for now, 100cc/hr can increase IVF rate if tolerated  -Pt with eGFR 40s-50s, unclear baseline SCr  -Trend SCr, avoid nephrotoxins    GASTROINTESTINAL  #Dilated CBD to 8mm  - Check RUQ U/S    INFECTIOUS DISEASE  #Sepsis  - Pt hypothermic to 88F in ED, WBC 15-16, tachycardic to 110s, lactate 2.8 initially. Unclear source  - UA negative, Flu/COVID/RSV negative. CT C/A/P also negative for infectious etiology, s/p vanc and zosyn in ED.  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  - TSH 0.97, free T4 1.1    MUSCULOSKELETAL  #Possible L Talus fracture  - Xray Foot shows acute minimally displaced fracture involving the L inferior aspect of the anterior calcaneal articular surface of the talus although may be artificial due to tilting of the foot  -seen by podiatry appreciate recs, pt with no pain at site  -CT LLE noncon ordered to r/o fx    HEME  No active issues    FLUIDS/ELECTROLYTES/NUTRITION  # Diet: DASH  # IVF: D5/LR      PROPHYLAXIS  # DVT: heparin gtt    CODE STATUS: full code, elects friend Nilam Hoffmann who lives in [place] as her surrogate decision maker.    To Follow Up on:  [ ] TTE   [ ] LE DVT study  [ ] RUQ U/S  [ ] Volume status  [ ] Reach out to PCP for collateral  [ ] Contact Friend Nilam Shun  [ ] CT LLE noncon ordered to r/o talus fracture.  [ ] Monitor for signs of bleeding on heparin drip  [ ] Monitor for afib - may need outpatient monitor  [ ] Consider neuro eval for parkinsons  [ ] Vascular surgery recs MICU Transfer Note    Transfer from: MICU    Transfer to: ( X ) Medicine    (  ) Telemetry     (   ) RCU        (    ) Palliative         (   ) Stroke Unit          (   ) __________________  8MON 803D    Accepting Physician: Ana  Signout given to:       HPI: "77 y/o F with PMH of Parkinson's disease, HTN, initially brought in by EMS after being found down on the floor by a neighbor. Per EMS, she fell on Tuesday and was down on the floor since then, was using bottle to urinate and defecate into while eating tinned sardines. Pt reports she has been taking medications but unsure which ones, per EMS there was an old bottle of losartan at her home.     Pt AAOx4, reports that she remembers initial fall that occurred on Tuesday. She was reorganizing some of her things and slipped, fell, and a box fell on her. She felt like she could not get up after because she could not balance herself. Denies head strike LOC, but does not remember EMS entering her apartment and bringing her out on a stretcher. Denies hx of T2DM, heart disease but does report an issue with her heart valve that she was evaluated for outpatient but reports that it was "fine" and that she did not need surgery. Pt reports she only takes valsartan and furosemide at home, does not remember dosages.    In ED, pt received 2L NS bolus and 1L LR bolus, vanc, zosyn, solumedrol 125 mg IV, heparin gtt, later started on levo gtt and given albumin 5% 250cc  CT Head unremarkable, CT C-spine shows age-indeterminate T1, T2, T3 endplate concavity, and R sided intravenous air including R subclavian and IJ veins extending into deep tissues of neck.      CT Chest/A/P: age-indeterminate mild L2 and moderate L3-L4 compression deformities, no fx. Subsegmental pulmonary arterial filling defect suggesting very small peripheral PE. Dilated pulmonary trunk compatible with chronic pulmonary arterial hypertension. No R heart strain."      MICU Course:  Pt taken off of pressors 15 minutes after arrival to MICU. Pt was started on IVF for volume resuscitation and RUTH for starvation ketosis. Her mental status improved with IVF. On heparin drip for possible new afib.    Assessment and Plan:  77 y/o F with PMH of Parkinson's disease, HTN, initially brought in by EMS after being found down on the floor by a neighbor. Pt found to be hypothermic, hypotensive, and dehydrated, transferred to MICU for further management of shock, likely hypovolemic vs septic, requiring levophed gtt.      NEUROLOGY  #AMS  - likely i/s/o dehydration vs sepsis, pt initially ~AAOx2 but improved back to baseline AAOx4 after IVF resuscitation    #Parkinson's disease  - pt denies being on medications for Parkinson's, notes difficulty to walk. Uses a walker at home  - f/u collateral with PCP  - Eventual neuro eval    PULMONARY  #Former smoker  - On RA.    CARDIOVASCULAR  #Shock, likely hypovolemic vs septic  - Pt initially hypotensive, likely hypovolemic vs septic, required levophed gtt in ED. Received 3L of IVF and 250cc albumin bolus, also started on D5 + LR  - Weaned off of levophed once pt arrived to MICU  - Continue to monitor BP, ensure pt is adequately hydrated.  - Hold home valsartan 80 and lasix 40    #PE  Pt with filling defect suggesting small segmental PE on CTA Chest  - c/w heparin gtt  - Check lower extremity duplex    #Elevated troponins  Troponin 68 -> 67 -> 85-->153-->137  -EKG, V2-V4 with TWI, STD V3-V4 ~1mm, no RUFINA, pt without CP or SOB, asymptomatic. Denied hx of CAD, reports she was supposed to have stress test done at some point but did not pursue  -Trend to peak, pt asymptomatic with no CP  - Check TTE    #Afib  - New?, transient afib on initial EKG but in sinus since  - On heparin infusion for PE  - Rates ~100    #Intravenous air  - CT spine with R sided intravenous air including R subclavian and IJ veins extending into deep tissues of neck  - Unclear etiology or significance. Consult vascular surgery    RENAL  #HAGMA, likely 2/2 ketosis  - Suspect starvation ketosis as pt denies being on SGLT2i, denies hx of T2DM. Pt down on floor with poor PO intake. BHB 5.4 -> 4.8-->1.2  - AG closed  - A1C 5.2, trend BHB  - Start diet on pt, c/w D5+LR, start ISS    #Hypernatremia  -Na 147 -> 150-->148, free water deficit 2L  -c/w D5+LR @ 100cc/hr for now  -monitor I/Os, pt has goodrich    #SILVANO vs CKD, Rhabdo/elevated CK  -CK on admission 1565, downtrending  -c/w D5+LR for now, 100cc/hr can increase IVF rate if tolerated  -Pt with eGFR 40s-50s, unclear baseline SCr  -Trend SCr, avoid nephrotoxins    GASTROINTESTINAL  #Dilated CBD to 8mm  - Check RUQ U/S    INFECTIOUS DISEASE  #Sepsis  - Pt hypothermic to 88F in ED, WBC 15-16, tachycardic to 110s, lactate 2.8 initially. Unclear source  - UA negative, Flu/COVID/RSV negative. CT C/A/P also negative for infectious etiology, s/p vanc and zosyn in ED.  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  - TSH 0.97, free T4 1.1    MUSCULOSKELETAL  #Possible L Talus fracture  - Xray Foot shows acute minimally displaced fracture involving the L inferior aspect of the anterior calcaneal articular surface of the talus although may be artificial due to tilting of the foot  -seen by podiatry appreciate recs, pt with no pain at site  -CT LLE noncon without fracture      HEME  No active issues    FLUIDS/ELECTROLYTES/NUTRITION  # Diet: DASH  # IVF: D5/LR      PROPHYLAXIS  # DVT: heparin gtt    CODE STATUS: full code, elects friend Nilam Hoffmann who lives in [place] as her surrogate decision maker.    To Follow Up on:  [ ] TTE   [ ] LE DVT study  [ ] RUQ U/S  [ ] Volume status  [ ] Reach out to PCP for collateral  [ ] Contact Friend Nilam Hoffmann  [ ] Monitor for signs of bleeding on heparin drip  [ ] Monitor for afib - may need outpatient monitor  [ ] Consider neuro eval for parkinsons  [ ] Vascular surgery recs MICU Transfer Note    Transfer from: MICU    Transfer to: ( X ) Medicine    (  ) Telemetry     (   ) RCU        (    ) Palliative         (   ) Stroke Unit          (   ) __________________  8MON 803D    Accepting Physician: Ana  Signout given to: JENIFFER Anton      HPI: "77 y/o F with PMH of Parkinson's disease, HTN, initially brought in by EMS after being found down on the floor by a neighbor. Per EMS, she fell on Tuesday and was down on the floor since then, was using bottle to urinate and defecate into while eating tinned sardines. Pt reports she has been taking medications but unsure which ones, per EMS there was an old bottle of losartan at her home.     Pt AAOx4, reports that she remembers initial fall that occurred on Tuesday. She was reorganizing some of her things and slipped, fell, and a box fell on her. She felt like she could not get up after because she could not balance herself. Denies head strike LOC, but does not remember EMS entering her apartment and bringing her out on a stretcher. Denies hx of T2DM, heart disease but does report an issue with her heart valve that she was evaluated for outpatient but reports that it was "fine" and that she did not need surgery. Pt reports she only takes valsartan and furosemide at home, does not remember dosages.    In ED, pt received 2L NS bolus and 1L LR bolus, vanc, zosyn, solumedrol 125 mg IV, heparin gtt, later started on levo gtt and given albumin 5% 250cc  CT Head unremarkable, CT C-spine shows age-indeterminate T1, T2, T3 endplate concavity, and R sided intravenous air including R subclavian and IJ veins extending into deep tissues of neck.     CT Chest/A/P: age-indeterminate mild L2 and moderate L3-L4 compression deformities, no fx. Subsegmental pulmonary arterial filling defect suggesting very small peripheral PE. Dilated pulmonary trunk compatible with chronic pulmonary arterial hypertension. No R heart strain."      MICU Course:  Pt taken off of pressors 15 minutes after arrival to MICU. Pt was started on IVF for volume resuscitation and RUTH for starvation ketosis. Her mental status improved with IVF. On heparin drip for possible new afib.    Assessment and Plan:  77 y/o F with PMH of Parkinson's disease, HTN, initially brought in by EMS after being found down on the floor by a neighbor. Pt found to be hypothermic, hypotensive, and dehydrated, transferred to MICU for further management of shock, likely hypovolemic vs septic, requiring levophed gtt.      NEUROLOGY  #AMS  - likely i/s/o dehydration vs sepsis, pt initially ~AAOx2 but improved back to baseline AAOx4 after IVF resuscitation    #Parkinson's disease  - pt denies being on medications for Parkinson's, notes difficulty to walk. Uses a walker at home  - f/u collateral with PCP  - Eventual neuro eval    PULMONARY  #Former smoker  - On RA.    CARDIOVASCULAR  #Shock, likely hypovolemic vs septic  - Pt initially hypotensive, likely hypovolemic vs septic, required levophed gtt in ED. Received 3L of IVF and 250cc albumin bolus, also started on D5 + LR  - Weaned off of levophed once pt arrived to MICU  - Continue to monitor BP, ensure pt is adequately hydrated.  - Hold home valsartan 80 and lasix 40    #PE  Pt with filling defect suggesting small segmental PE on CTA Chest  - c/w heparin gtt  - Check lower extremity duplex    #Elevated troponins  Troponin 68 -> 67 -> 85-->153-->137  -EKG, V2-V4 with TWI, STD V3-V4 ~1mm, no RUFINA, pt without CP or SOB, asymptomatic. Denied hx of CAD, reports she was supposed to have stress test done at some point but did not pursue  -Trend to peak, pt asymptomatic with no CP  - Check TTE    #Afib  - New?, transient afib on initial EKG but in sinus since  - On heparin infusion for PE  - Rates ~100    #Intravenous air  - CT spine with R sided intravenous air including R subclavian and IJ veins extending into deep tissues of neck  - Unclear etiology or significance. Consult vascular surgery    RENAL  #HAGMA, likely 2/2 ketosis  - Suspect starvation ketosis as pt denies being on SGLT2i, denies hx of T2DM. Pt down on floor with poor PO intake. BHB 5.4 -> 4.8-->1.2  - AG closed  - A1C 5.2, trend BHB  - Start diet on pt, c/w D5+LR, start ISS    #Hypernatremia  -Na 147 -> 150-->148, free water deficit 2L  -c/w D5+LR @ 100cc/hr for now  -monitor I/Os, pt has goodrich    #SILVANO vs CKD, Rhabdo/elevated CK  -CK on admission 1565, downtrending  -c/w D5+LR for now, 100cc/hr can increase IVF rate if tolerated  -Pt with eGFR 40s-50s, unclear baseline SCr  -Trend SCr, avoid nephrotoxins    GASTROINTESTINAL  #Dilated CBD to 8mm  - Check RUQ U/S    INFECTIOUS DISEASE  #Sepsis  - Pt hypothermic to 88F in ED, WBC 15-16, tachycardic to 110s, lactate 2.8 initially. Unclear source  - UA negative, Flu/COVID/RSV negative. CT C/A/P also negative for infectious etiology, s/p vanc and zosyn in ED.  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  - TSH 0.97, free T4 1.1    MUSCULOSKELETAL  #Possible L Talus fracture  - Xray Foot shows acute minimally displaced fracture involving the L inferior aspect of the anterior calcaneal articular surface of the talus although may be artificial due to tilting of the foot  -seen by podiatry appreciate recs, pt with no pain at site  -CT LLE noncon without fracture      HEME  No active issues    FLUIDS/ELECTROLYTES/NUTRITION  # Diet: DASH  # IVF: D5/LR      PROPHYLAXIS  # DVT: heparin gtt    CODE STATUS: full code, elects friend Nilam Shun who lives in [place] as her surrogate decision maker.    To Follow Up on:  [ ] TTE   [ ] LE DVT study  [ ] RUQ U/S  [ ] Volume status  [ ] Reach out to PCP for collateral  [ ] Contact Friend Nilam Hoffmann  [ ] Monitor for signs of bleeding on heparin drip  [ ] Monitor for afib - may need outpatient monitor  [ ] Consider neuro eval for parkinsons  [ ] F/u cultures

## 2024-03-24 NOTE — PROGRESS NOTE ADULT - SUBJECTIVE AND OBJECTIVE BOX
[facility] Division of Hospital Medicine  Jennifer Kiser MD  Available via MS Teams      SUBJECTIVE / OVERNIGHT EVENTS: Pt transferred from MICU to medical floor. Pt reports no symptoms. She reports feeling very well and rested. She denies nausea, vomiting, diarrhea, shortness of breath. chest pain, palpitations. She does note that she reportedly have MR but on visit to other cardiologists was told that it wasn't as serious. On ROS review she report very mild MSK left abdominal pain which is relieved with hot packs.    ADDITIONAL REVIEW OF SYSTEMS: ROS reviewed and found to be negative    MEDICATIONS  (STANDING):  azithromycin  IVPB      cefTRIAXone   IVPB 1000 milliGRAM(s) IV Intermittent every 24 hours  dextrose 5% + lactated ringers. 1000 milliLiter(s) (100 mL/Hr) IV Continuous <Continuous>  heparin  Infusion. 1500 Unit(s)/Hr (15 mL/Hr) IV Continuous <Continuous>  influenza  Vaccine (HIGH DOSE) 0.7 milliLiter(s) IntraMuscular once    MEDICATIONS  (PRN):      I&O's Summary    23 Mar 2024 07:01  -  24 Mar 2024 07:00  --------------------------------------------------------  IN: 390 mL / OUT: 2025 mL / NET: -1635 mL    24 Mar 2024 07:01  -  24 Mar 2024 17:04  --------------------------------------------------------  IN: 1510 mL / OUT: 525 mL / NET: 985 mL        PHYSICAL EXAM:  Vital Signs Last 24 Hrs  T(C): 37.3 (24 Mar 2024 16:00), Max: 37.6 (24 Mar 2024 00:01)  T(F): 99.2 (24 Mar 2024 16:00), Max: 99.7 (24 Mar 2024 09:26)  HR: 89 (24 Mar 2024 16:00) (69 - 111)  BP: 97/52 (24 Mar 2024 16:00) (85/46 - 168/97)  BP(mean): 69 (24 Mar 2024 15:00) (56 - 122)  RR: 18 (24 Mar 2024 16:00) (12 - 27)  SpO2: 96% (24 Mar 2024 16:00) (88% - 100%)    Parameters below as of 24 Mar 2024 16:00  Patient On (Oxygen Delivery Method): room air      CONSTITUTIONAL: NAD, laying in stretcher resting  EYES:conjunctiva and sclera clear  ENMT: Moist oral mucosa  NECK: Supple  RESPIRATORY: Normal respiratory effort; lungs are clear to auscultation bilaterally  CARDIOVASCULAR: Regular rate and rhythm, normal S1 and S2, No lower extremity edema  ABDOMEN: Nontender to palpation, normoactive bowel sounds  MUSCULOSKELETAL:  No joint swelling   PSYCH: A+O to person, place, and time  NEUROLOGY: Sensation intact  SKIN: No rashes    LABS:                        10.2   14.57 )-----------( 270      ( 24 Mar 2024 09:54 )             29.0     03-24    148<H>  |  113<H>  |  26<H>  ----------------------------<  230<H>  3.6   |  18<L>  |  1.10    Ca    8.7      24 Mar 2024 09:54  Phos  3.7     03-24  Mg     2.1     03-24    TPro  5.3<L>  /  Alb  3.3  /  TBili  0.7  /  DBili  x   /  AST  71<H>  /  ALT  33  /  AlkPhos  60  03-24    PT/INR - ( 24 Mar 2024 09:54 )   PT: 12.8 sec;   INR: 1.23 ratio         PTT - ( 24 Mar 2024 09:54 )  PTT:>200.0 sec  CARDIAC MARKERS ( 24 Mar 2024 09:54 )  x     / x     / 1030 U/L / x     / x      CARDIAC MARKERS ( 24 Mar 2024 02:59 )  x     / x     / 1282 U/L / x     / x      CARDIAC MARKERS ( 23 Mar 2024 10:48 )  x     / x     / 1565 U/L / x     / x          Urinalysis Basic - ( 24 Mar 2024 09:54 )          Culture - Urine (collected 23 Mar 2024 11:13)  Source: Clean Catch Clean Catch (Midstream)  Final Report (24 Mar 2024 15:24):    <10,000 CFU/mL Normal Urogenital Jud    Culture - Blood (collected 23 Mar 2024 10:30)  Source: .Blood Blood-Peripheral  Preliminary Report (24 Mar 2024 17:01):    No growth at 24 hours    Culture - Blood (collected 23 Mar 2024 10:15)  Source: .Blood Blood-Peripheral  Preliminary Report (24 Mar 2024 17:01):    No growth at 24 hours

## 2024-03-24 NOTE — PATIENT PROFILE ADULT - VISION (WITH CORRECTIVE LENSES IF THE PATIENT USUALLY WEARS THEM):
58 year old male with history of ESRD on HD TTS last dialysis Friday, CHF (EF 13%, s/p AICD), multiple Thoracic interventions (10/2018, R Thoracotomy/Decortication/Chest Wall Reconstruction), and recent admission for septic shock and respiratory failure in the setting of aspirated foreign body and MRSA empyema presenting from rehab with reported "coffee ground emesis" and dark-colored drainage from PEG and found to be febrile. Nephrology consulted as patient is ESRD with HD. Normal vision: sees adequately in most situations; can see medication labels, newsprint

## 2024-03-24 NOTE — H&P ADULT - NSHPPHYSICALEXAM_GEN_ALL_CORE
VITALS:   T(C): 37.6 (03-24-24 @ 00:01), Max: 37.6 (03-24-24 @ 00:01)  HR: 88 (03-24-24 @ 00:01) (69 - 120)  BP: 120/46 (03-24-24 @ 00:01) (57/39 - 146/130)  RR: 16 (03-24-24 @ 00:01) (12 - 24)  SpO2: 98% (03-24-24 @ 00:01) (94% - 98%)    GENERAL: NAD, lying in bed comfortably  HEAD:  Atraumatic, Normocephalic  EYES: EOMI, PERRLA, conjunctiva and sclera clear  ENT: Moist mucous membranes  NECK: Supple, No JVD  CHEST/LUNG: Clear to auscultation bilaterally; No rales, rhonchi, wheezing, or rubs. Unlabored respirations  HEART: Regular rate and rhythm; No murmurs, rubs, or gallops  ABDOMEN: BSx4; Soft, nontender, nondistended  EXTREMITIES:  2+ Peripheral Pulses, brisk capillary refill. No clubbing, cyanosis, or edema  NERVOUS SYSTEM:  A&Ox3, no focal deficits   SKIN: No rashes or lesions  psych: normal behavior, normal affect

## 2024-03-24 NOTE — CONSULT NOTE ADULT - SUBJECTIVE AND OBJECTIVE BOX
VASCULAR SURGERY CONSULT NOTE    Consulting surgical team: Vascular Surgery [id number]  Consulting attending: Dr. Lal    HPI:  79 y/o F with PMH of Parkinson's disease, HTN, initially brought in by EMS after being found down on the floor by a neighbor. Per EMS, she fell on Tuesday and was down on the floor since then, was using bottle to urinate and defecate into while eating tinned sardines. Pt reports she has been taking medications but unsure which ones, per EMS there was an old bottle of losartan at her home.     Pt AAOx4, reports that she remembers initial fall that occurred on Tuesday. She was reorganizing some of her things and slipped, fell, and a box fell on her. She felt like she could not get up after because she could not balance herself. Denies head strike LOC, but does not remember EMS entering her apartment and bringing her out on a stretcher. Denies hx of T2DM, heart disease but does report an issue with her heart valve that she was evaluated for outpatient but reports that it was "fine" and that she did not need surgery. Pt reports she only takes valsartan and furosemide at home, does not remember dosages.    In ED, pt received 2L NS bolus and 1L LR bolus, vanc, zosyn, solumedrol 125 mg IV, heparin gtt, later started on levo gtt and given albumin 5% 250cc  CT Head unremarkable, CT C-spine shows age-indeterminate T1, T2, T3 endplate concavity, and R sided intravenous air including R subclavian and IJ veins extending into deep tissues of neck.      CT Chest/A/P: age-indeterminate mild L2 and moderate L3-L4 compression deformities, no fx. Subsegmental pulmonary arterial filling defect suggesting very small peripheral PE. Dilated pulmonary trunk compatible with chronic pulmonary arterial hypertension. No R heart strain. (24 Mar 2024 00:01)    Incidentally noted to have intravascular air in RIJ and R SCV on CT C-spine w/air extending into soft tissues. Vascular Surgery consulted. Patietn seen and examined. Per pt and MICU no R sided interventions have been performed, though patient does have RUE PIV's. Denies difficulty swallowing, neck pain, neck stiffness. No history of vascular surgery.      PAST MEDICAL HISTORY:  H/O Parkinson's disease    HTN (hypertension)        PAST SURGICAL HISTORY:      MEDICATIONS:  azithromycin  IVPB      cefTRIAXone   IVPB 1000 milliGRAM(s) IV Intermittent every 24 hours  dextrose 5% + lactated ringers. 1000 milliLiter(s) IV Continuous <Continuous>  heparin  Infusion. 1500 Unit(s)/Hr IV Continuous <Continuous>  influenza  Vaccine (HIGH DOSE) 0.7 milliLiter(s) IntraMuscular once  norepinephrine Infusion 0.05 MICROgram(s)/kG/Min IV Continuous <Continuous>      ALLERGIES:  No Known Allergies      VITALS & I/Os:  Vital Signs Last 24 Hrs  T(C): 37.4 (24 Mar 2024 12:00), Max: 37.6 (24 Mar 2024 00:01)  T(F): 99.3 (24 Mar 2024 12:00), Max: 99.7 (24 Mar 2024 09:26)  HR: 84 (24 Mar 2024 13:00) (69 - 111)  BP: 87/48 (24 Mar 2024 13:00) (57/39 - 168/97)  BP(mean): 65 (24 Mar 2024 13:00) (45 - 122)  RR: 18 (24 Mar 2024 13:00) (12 - 27)  SpO2: 100% (24 Mar 2024 13:00) (88% - 100%)    Parameters below as of 24 Mar 2024 09:26  Patient On (Oxygen Delivery Method): room air        I&O's Summary    23 Mar 2024 07:01  -  24 Mar 2024 07:00  --------------------------------------------------------  IN: 390 mL / OUT: 2025 mL / NET: -1635 mL    24 Mar 2024 07:01  -  24 Mar 2024 14:25  --------------------------------------------------------  IN: 784 mL / OUT: 275 mL / NET: 509 mL        PHYSICAL EXAM:  General: well developed, well nourished, NAD  Neuro: alert and oriented, no focal deficits, moves all extremities spontaneously  HEENT: NCAT, EOMI, anicteric, mucosa moist  Respiratory: airway patent, respirations unlabored  CVS: regular rate and rhythm  Abdomen: soft, nontender, nondistended  Extremities: no edema, sensation and movement grossly intact  Skin: warm, dry, appropriate color      LABS:                        10.2   14.57 )-----------( 270      ( 24 Mar 2024 09:54 )             29.0     03-24    148<H>  |  113<H>  |  26<H>  ----------------------------<  230<H>  3.6   |  18<L>  |  1.10    Ca    8.7      24 Mar 2024 09:54  Phos  3.7     03-24  Mg     2.1     03-24    TPro  5.3<L>  /  Alb  3.3  /  TBili  0.7  /  DBili  x   /  AST  71<H>  /  ALT  33  /  AlkPhos  60  03-24    Lactate:  03-24 @ 09:44  1.9  03-24 @ 02:49  1.6  03-23 @ 15:46  1.2    PT/INR - ( 24 Mar 2024 09:54 )   PT: 12.8 sec;   INR: 1.23 ratio         PTT - ( 24 Mar 2024 09:54 )  PTT:>200.0 sec    CARDIAC MARKERS ( 24 Mar 2024 09:54 )  x     / x     / 1030 U/L / x     / x      CARDIAC MARKERS ( 24 Mar 2024 02:59 )  x     / x     / 1282 U/L / x     / x      CARDIAC MARKERS ( 23 Mar 2024 10:48 )  x     / x     / 1565 U/L / x     / x            Urinalysis Basic - ( 24 Mar 2024 09:54 )    Color: x / Appearance: x / SG: x / pH: x  Gluc: 230 mg/dL / Ketone: x  / Bili: x / Urobili: x   Blood: x / Protein: x / Nitrite: x   Leuk Esterase: x / RBC: x / WBC x   Sq Epi: x / Non Sq Epi: x / Bacteria: x        IMAGING:    < from: CT Cervical Spine No Cont (03.23.24 @ 12:19) >  IMPRESSION:    CT BRAIN: No acute intracranial bleeding. Central volume loss and chronic   microvascular ischemic changes.    CT CERVICAL SPINE: Age-indeterminate superior T1, T2, and T3 endplate   concavity is likely chronic and degenerative. Correlate with site of pain.    Right-sided intravenous air including the right subclavian and right   internal jugular veins extending into the deep tissues of the neck.   Correlate for instrumentation and iatrogenic causes.    < end of copied text >

## 2024-03-24 NOTE — PROGRESS NOTE ADULT - ASSESSMENT
79 y/o F with PMH of Parkinson's disease, HTN, initially brought in by EMS after being found down on the floor by a neighbor. Pt found to be hypothermic, hypotensive, and dehydrated, transferred to MICU for further management of shock, likely hypovolemic vs septic, requiring levophed gtt.      NEUROLOGY  #AMS  -likely i/s/o dehydration vs sepsis, pt initially ~AAOx2 but improved back to baseline AAOx4 after IVF resuscitation      #Parkinson's disease  - pt denies being on medications for Parkinson's, notes difficulty to walk. Uses a walker at home  - f/u collateral with PCP  - Neuro eval    PULMONARY  #Former smoker  - On RA.    CARDIOVASCULAR  #Shock, likely hypovolemic vs septic  -Pt initially hypotensive, likely hypovolemic vs septic, required levophed gtt in ED. Received 3L of IVF and 250cc albumin bolus, also started on D5 + LR 125cc/hr.  -Weaned off of levophed once pt arrived to MICU  -continue to monitor BP, ensure pt is adequately hydrated.    #PE  Pt with filling defect suggesting small segmental PE on CTA Chest  - c/w heparin gtt  - Check lower extremity duplex    #Elevated troponins  Troponin 68 -> 67 -> 85-->153  -EKG, V2-V4 with TWI, STD V3-V4 ~1mm, no RUFINA, pt without CP or SOB, asymptomatic. Denied hx of CAD, reports she was supposed to have stress test done at some point but did not pursue  -Trend to peak, pt asymptomatic with no CP  - Check TTE    RENAL  #HAGMA, likely 2/2 ketosis  - Suspect starvation ketosis as pt denies being on SGLT2i, denies hx of T2DM. Pt down on floor with poor PO intake. BHB 5.4 -> 4.8.  -check A1C, trend BHB  -verify med rec in AM with pt's PCP  -start diet on pt, c/w D5+LR, start ISS    #Hypernatremia  -Na 147 -> 150, free water deficit 2L  -c/w D5+LR @ 100cc/hr for now  -monitor I/Os, pt has goodrich    #SILVANO vs CKD, Rhabdo/elevated CK  -CK on admission 1565, trend CK  -c/w D5+LR for now, 100cc/hr can increase IVF rate if tolerated  -Pt with eGFR 40s-50s, unclear baseline SCr  -Trend SCr, avoid nephrotoxins    GASTROINTESTINAL  No active issues    INFECTIOUS DISEASE  #Sepsis, possible septic shock  - Pt hypothermic to 88F in ED, WBC 15-16, tachycardic to 110s, lactate 2.8 initially. Unclear source  - UA negative, Flu/COVID/RSV negative. CT C/A/P also negative for infectious etiology, s/p vanc and zosyn in ED.  - Procal 0.12  - Trend WBC and fever curve, unclear source. monitor off abx for now.  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  - Check TSH    MUSCULOSKELETAL  #Possible LTalus fracture  - Xray Foot shows acute minimally displaced fracture involving the L inferior aspect of the anterior calcaneal articular surface of the talus although may be artificial due to tilting of the foot  -seen by podiatry appreciate recs, pt with no pain at site  -CT LLE noncon ordered to r/o fx    HEME  No active issues    FLUIDS/ELECTROLYTES/NUTRITION  # Diet: DASH  # IVF: D5/LR  # Monitor, Replete to K>4 and Mg>2  # Transfuse for Hgb <7, Plt<10      PROPHYLAXIS  # DVT: heparin gtt    CODE STATUS: full code 77 y/o F with PMH of Parkinson's disease, HTN, initially brought in by EMS after being found down on the floor by a neighbor. Pt found to be hypothermic, hypotensive, and dehydrated, transferred to MICU for further management of shock, likely hypovolemic vs septic, requiring levophed gtt.      NEUROLOGY  #AMS  -likely i/s/o dehydration vs sepsis, pt initially ~AAOx2 but improved back to baseline AAOx4 after IVF resuscitation      #Parkinson's disease  - pt denies being on medications for Parkinson's, notes difficulty to walk. Uses a walker at home  - f/u collateral with PCP  - Neuro eval    PULMONARY  #Former smoker  - On RA.    CARDIOVASCULAR  #Shock, likely hypovolemic vs septic  -Pt initially hypotensive, likely hypovolemic vs septic, required levophed gtt in ED. Received 3L of IVF and 250cc albumin bolus, also started on D5 + LR 125cc/hr.  -Weaned off of levophed once pt arrived to MICU  -continue to monitor BP, ensure pt is adequately hydrated.    #PE  Pt with filling defect suggesting small segmental PE on CTA Chest  - c/w heparin gtt  - Check lower extremity duplex    #Elevated troponins  Troponin 68 -> 67 -> 85-->153  -EKG, V2-V4 with TWI, STD V3-V4 ~1mm, no RUFINA, pt without CP or SOB, asymptomatic. Denied hx of CAD, reports she was supposed to have stress test done at some point but did not pursue  -Trend to peak, pt asymptomatic with no CP  - Check TTE    #Afib  - New?, transient afib on initial EKG  - On heparin infusion for PE  - Rates ~100    RENAL  #HAGMA, likely 2/2 ketosis  - Suspect starvation ketosis as pt denies being on SGLT2i, denies hx of T2DM. Pt down on floor with poor PO intake. BHB 5.4 -> 4.8.  - A1C 5.2, trend BHB  - Verify med rec in AM with pt's PCP  - Start diet on pt, c/w D5+LR, start ISS    #Hypernatremia  -Na 147 -> 150, free water deficit 2L  -c/w D5+LR @ 100cc/hr for now  -monitor I/Os, pt has goodrich    #SILVANO vs CKD, Rhabdo/elevated CK  -CK on admission 1565, trend CK  -c/w D5+LR for now, 100cc/hr can increase IVF rate if tolerated  -Pt with eGFR 40s-50s, unclear baseline SCr  -Trend SCr, avoid nephrotoxins    GASTROINTESTINAL  No active issues    INFECTIOUS DISEASE  #Sepsis, possible septic shock  - Pt hypothermic to 88F in ED, WBC 15-16, tachycardic to 110s, lactate 2.8 initially. Unclear source  - UA negative, Flu/COVID/RSV negative. CT C/A/P also negative for infectious etiology, s/p vanc and zosyn in ED.  - Procal 0.12  - Trend WBC and fever curve, unclear source. monitor off abx for now.  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  - Check TSH    MUSCULOSKELETAL  #Possible LTalus fracture  - Xray Foot shows acute minimally displaced fracture involving the L inferior aspect of the anterior calcaneal articular surface of the talus although may be artificial due to tilting of the foot  -seen by podiatry appreciate recs, pt with no pain at site  -CT LLE noncon ordered to r/o fx    HEME  No active issues    FLUIDS/ELECTROLYTES/NUTRITION  # Diet: DASH  # IVF: D5/LR      PROPHYLAXIS  # DVT: heparin gtt    CODE STATUS: full code 79 y/o F with PMH of Parkinson's disease, HTN, initially brought in by EMS after being found down on the floor by a neighbor. Pt found to be hypothermic, hypotensive, and dehydrated, transferred to MICU for further management of shock, likely hypovolemic vs septic, requiring levophed gtt.      NEUROLOGY  #AMS  - likely i/s/o dehydration vs sepsis, pt initially ~AAOx2 but improved back to baseline AAOx4 after IVF resuscitation    #Parkinson's disease  - pt denies being on medications for Parkinson's, notes difficulty to walk. Uses a walker at home  - f/u collateral with PCP  - Eventual neuro eval    PULMONARY  #Former smoker  - On RA.    CARDIOVASCULAR  #Shock, likely hypovolemic vs septic  - Pt initially hypotensive, likely hypovolemic vs septic, required levophed gtt in ED. Received 3L of IVF and 250cc albumin bolus, also started on D5 + LR 125cc/hr.  - Weaned off of levophed once pt arrived to MICU  - Continue to monitor BP, ensure pt is adequately hydrated.    #PE  Pt with filling defect suggesting small segmental PE on CTA Chest  - c/w heparin gtt  - Check lower extremity duplex    #Elevated troponins  Troponin 68 -> 67 -> 85-->153  -EKG, V2-V4 with TWI, STD V3-V4 ~1mm, no RUFINA, pt without CP or SOB, asymptomatic. Denied hx of CAD, reports she was supposed to have stress test done at some point but did not pursue  -Trend to peak, pt asymptomatic with no CP  - Check TTE    #Afib  - New?, transient afib on initial EKG  - On heparin infusion for PE  - Rates ~100    RENAL  #HAGMA, likely 2/2 ketosis  - Suspect starvation ketosis as pt denies being on SGLT2i, denies hx of T2DM. Pt down on floor with poor PO intake. BHB 5.4 -> 4.8.  - A1C 5.2, trend BHB  - Verify med rec in AM with pt's PCP  - Start diet on pt, c/w D5+LR, start ISS    #Hypernatremia  -Na 147 -> 150, free water deficit 2L  -c/w D5+LR @ 100cc/hr for now  -monitor I/Os, pt has goodrich    #SILVANO vs CKD, Rhabdo/elevated CK  -CK on admission 1565, trend CK  -c/w D5+LR for now, 100cc/hr can increase IVF rate if tolerated  -Pt with eGFR 40s-50s, unclear baseline SCr  -Trend SCr, avoid nephrotoxins    GASTROINTESTINAL  No active issues    INFECTIOUS DISEASE  #Sepsis, possible septic shock  - Pt hypothermic to 88F in ED, WBC 15-16, tachycardic to 110s, lactate 2.8 initially. Unclear source  - UA negative, Flu/COVID/RSV negative. CT C/A/P also negative for infectious etiology, s/p vanc and zosyn in ED.  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  - TSH 0.97, free T4 1.1    MUSCULOSKELETAL  #Possible LTalus fracture  - Xray Foot shows acute minimally displaced fracture involving the L inferior aspect of the anterior calcaneal articular surface of the talus although may be artificial due to tilting of the foot  -seen by podiatry appreciate recs, pt with no pain at site  -CT LLE noncon ordered to r/o fx    HEME  No active issues    FLUIDS/ELECTROLYTES/NUTRITION  # Diet: DASH  # IVF: D5/LR      PROPHYLAXIS  # DVT: heparin gtt    CODE STATUS: full code, elects friend Nilam Hoffmann who lives in [place] as her surrogate decision maker. 77 y/o F with PMH of Parkinson's disease, HTN, initially brought in by EMS after being found down on the floor by a neighbor. Pt found to be hypothermic, hypotensive, and dehydrated, transferred to MICU for further management of shock, likely hypovolemic vs septic, requiring levophed gtt.      NEUROLOGY  #AMS  - likely i/s/o dehydration vs sepsis, pt initially ~AAOx2 but improved back to baseline AAOx4 after IVF resuscitation    #Parkinson's disease  - pt denies being on medications for Parkinson's, notes difficulty to walk. Uses a walker at home  - f/u collateral with PCP  - Eventual neuro eval    PULMONARY  #Former smoker  - On RA.    CARDIOVASCULAR  #Shock, likely hypovolemic vs septic  - Pt initially hypotensive, likely hypovolemic vs septic, required levophed gtt in ED. Received 3L of IVF and 250cc albumin bolus, also started on D5 + LR  - Weaned off of levophed once pt arrived to MICU  - Continue to monitor BP, ensure pt is adequately hydrated.    #PE  Pt with filling defect suggesting small segmental PE on CTA Chest  - c/w heparin gtt  - Check lower extremity duplex    #Elevated troponins  Troponin 68 -> 67 -> 85-->153-->137  -EKG, V2-V4 with TWI, STD V3-V4 ~1mm, no RUFINA, pt without CP or SOB, asymptomatic. Denied hx of CAD, reports she was supposed to have stress test done at some point but did not pursue  -Trend to peak, pt asymptomatic with no CP  - Check TTE    #Afib  - New?, transient afib on initial EKG  - On heparin infusion for PE  - Rates ~100    RENAL  #HAGMA, likely 2/2 ketosis  - Suspect starvation ketosis as pt denies being on SGLT2i, denies hx of T2DM. Pt down on floor with poor PO intake. BHB 5.4 -> 4.8-->1.2  - A1C 5.2, trend BHB  - Start diet on pt, c/w D5+LR, start ISS    #Hypernatremia  -Na 147 -> 150-->148, free water deficit 2L  -c/w D5+LR @ 100cc/hr for now  -monitor I/Os, pt has goodrich    #SILVANO vs CKD, Rhabdo/elevated CK  -CK on admission 1565, downtrending  -c/w D5+LR for now, 100cc/hr can increase IVF rate if tolerated  -Pt with eGFR 40s-50s, unclear baseline SCr  -Trend SCr, avoid nephrotoxins    GASTROINTESTINAL  No active issues    INFECTIOUS DISEASE  #Sepsis  - Pt hypothermic to 88F in ED, WBC 15-16, tachycardic to 110s, lactate 2.8 initially. Unclear source  - UA negative, Flu/COVID/RSV negative. CT C/A/P also negative for infectious etiology, s/p vanc and zosyn in ED.  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  - TSH 0.97, free T4 1.1    MUSCULOSKELETAL  #Possible LTalus fracture  - Xray Foot shows acute minimally displaced fracture involving the L inferior aspect of the anterior calcaneal articular surface of the talus although may be artificial due to tilting of the foot  -seen by podiatry appreciate recs, pt with no pain at site  -CT LLE noncon ordered to r/o fx    HEME  No active issues    FLUIDS/ELECTROLYTES/NUTRITION  # Diet: DASH  # IVF: D5/LR      PROPHYLAXIS  # DVT: heparin gtt    CODE STATUS: full code, elects friend Nilam Hoffmann who lives in [place] as her surrogate decision maker. Assessment and Plan:  77 y/o F with PMH of Parkinson's disease, HTN, initially brought in by EMS after being found down on the floor by a neighbor. Pt found to be hypothermic, hypotensive, and dehydrated, transferred to MICU for further management of shock, likely hypovolemic vs septic, requiring levophed gtt.      NEUROLOGY  #AMS  - likely i/s/o dehydration vs sepsis, pt initially ~AAOx2 but improved back to baseline AAOx4 after IVF resuscitation    #Parkinson's disease  - pt denies being on medications for Parkinson's, notes difficulty to walk. Uses a walker at home  - f/u collateral with PCP  - Eventual neuro eval    PULMONARY  #Former smoker  - On RA.    CARDIOVASCULAR  #Shock, likely hypovolemic vs septic  - Pt initially hypotensive, likely hypovolemic vs septic, required levophed gtt in ED. Received 3L of IVF and 250cc albumin bolus, also started on D5 + LR  - Weaned off of levophed once pt arrived to MICU  - Continue to monitor BP, ensure pt is adequately hydrated.  - Hold home valsartan 80 and lasix 40    #PE  Pt with filling defect suggesting small segmental PE on CTA Chest  - c/w heparin gtt  - Check lower extremity duplex    #Elevated troponins  Troponin 68 -> 67 -> 85-->153-->137  -EKG, V2-V4 with TWI, STD V3-V4 ~1mm, no RUFINA, pt without CP or SOB, asymptomatic. Denied hx of CAD, reports she was supposed to have stress test done at some point but did not pursue  -Trend to peak, pt asymptomatic with no CP  - Check TTE    #Afib  - New?, transient afib on initial EKG but in sinus since  - On heparin infusion for PE  - Rates ~100    #Intravenous air  - CT spine with R sided intravenous air including R subclavian and IJ veins extending into deep tissues of neck  - Unclear etiology or significance. Consult vascular surgery    RENAL  #HAGMA, likely 2/2 ketosis  - Suspect starvation ketosis as pt denies being on SGLT2i, denies hx of T2DM. Pt down on floor with poor PO intake. BHB 5.4 -> 4.8-->1.2  - AG closed  - A1C 5.2, trend BHB  - Start diet on pt, c/w D5+LR, start ISS    #Hypernatremia  -Na 147 -> 150-->148, free water deficit 2L  -c/w D5+LR @ 100cc/hr for now  -monitor I/Os, pt has goodrich    #SILVANO vs CKD, Rhabdo/elevated CK  -CK on admission 1565, downtrending  -c/w D5+LR for now, 100cc/hr can increase IVF rate if tolerated  -Pt with eGFR 40s-50s, unclear baseline SCr  -Trend SCr, avoid nephrotoxins    GASTROINTESTINAL  #Dilated CBD to 8mm  - Check RUQ U/S    INFECTIOUS DISEASE  #Sepsis  - Pt hypothermic to 88F in ED, WBC 15-16, tachycardic to 110s, lactate 2.8 initially. Unclear source  - UA negative, Flu/COVID/RSV negative. CT C/A/P also negative for infectious etiology, s/p vanc and zosyn in ED.  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  - TSH 0.97, free T4 1.1    MUSCULOSKELETAL  #Possible L Talus fracture  - Xray Foot shows acute minimally displaced fracture involving the L inferior aspect of the anterior calcaneal articular surface of the talus although may be artificial due to tilting of the foot  -seen by podiatry appreciate recs, pt with no pain at site  -CT LLE noncon ordered to r/o fx    HEME  No active issues    FLUIDS/ELECTROLYTES/NUTRITION  # Diet: DASH  # IVF: D5/LR      PROPHYLAXIS  # DVT: heparin gtt    CODE STATUS: full code, elects friend Nilam Hoffmann who lives in [place] as her surrogate decision maker.   Assessment and Plan:  77 y/o F with PMH of Parkinson's disease, HTN, initially brought in by EMS after being found down on the floor by a neighbor. Pt found to be hypothermic, hypotensive, and dehydrated, transferred to MICU for further management of shock, likely hypovolemic vs septic, requiring levophed gtt.      NEUROLOGY  #AMS  - likely i/s/o dehydration vs sepsis, pt initially ~AAOx2 but improved back to baseline AAOx4 after IVF resuscitation    #Parkinson's disease  - pt denies being on medications for Parkinson's, notes difficulty to walk. Uses a walker at home  - f/u collateral with PCP  - Eventual neuro eval    PULMONARY  #Former smoker  - On RA.    CARDIOVASCULAR  #Shock, likely hypovolemic vs septic  - Pt initially hypotensive, likely hypovolemic vs septic, required levophed gtt in ED. Received 3L of IVF and 250cc albumin bolus, also started on D5 + LR  - Weaned off of levophed once pt arrived to MICU  - Continue to monitor BP, ensure pt is adequately hydrated.  - Hold home valsartan 80 and lasix 40    #PE  Pt with filling defect suggesting small segmental PE on CTA Chest  - c/w heparin gtt  - Check lower extremity duplex    #Elevated troponins  Troponin 68 -> 67 -> 85-->153-->137  -EKG, V2-V4 with TWI, STD V3-V4 ~1mm, no RUFINA, pt without CP or SOB, asymptomatic. Denied hx of CAD, reports she was supposed to have stress test done at some point but did not pursue  -Trend to peak, pt asymptomatic with no CP  - Check TTE    #Afib  - New?, transient afib on initial EKG but in sinus since  - On heparin infusion for PE  - Rates ~100    #Intravenous air  - CT spine with R sided intravenous air including R subclavian and IJ veins extending into deep tissues of neck  - Unclear etiology or significance. Consult vascular surgery    RENAL  #HAGMA, likely 2/2 ketosis  - Suspect starvation ketosis as pt denies being on SGLT2i, denies hx of T2DM. Pt down on floor with poor PO intake. BHB 5.4 -> 4.8-->1.2  - AG closed  - A1C 5.2, trend BHB  - Start diet on pt, c/w D5+LR, start ISS    #Hypernatremia  -Na 147 -> 150-->148, free water deficit 2L  -c/w D5+LR @ 100cc/hr for now  -monitor I/Os, pt has goodrich    #SILVANO vs CKD, Rhabdo/elevated CK  -CK on admission 1565, downtrending  -c/w D5+LR for now, 100cc/hr can increase IVF rate if tolerated  -Pt with eGFR 40s-50s, unclear baseline SCr  -Trend SCr, avoid nephrotoxins    GASTROINTESTINAL  #Dilated CBD to 8mm  - Check RUQ U/S    INFECTIOUS DISEASE  #Sepsis  - Pt hypothermic to 88F in ED, WBC 15-16, tachycardic to 110s, lactate 2.8 initially. Unclear source  - UA negative, Flu/COVID/RSV negative. CT C/A/P also negative for infectious etiology, s/p vanc and zosyn in ED.  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  - TSH 0.97, free T4 1.1    MUSCULOSKELETAL  #Possible L Talus fracture  - Xray Foot shows acute minimally displaced fracture involving the L inferior aspect of the anterior calcaneal articular surface of the talus although may be artificial due to tilting of the foot  -seen by podiatry appreciate recs, pt with no pain at site  -CT LLE noncon without fracture    HEME  No active issues    FLUIDS/ELECTROLYTES/NUTRITION  # Diet: DASH  # IVF: D5/LR      PROPHYLAXIS  # DVT: heparin gtt    CODE STATUS: full code, elects friend Nilam Hoffmann who lives in [place] as her surrogate decision maker.

## 2024-03-24 NOTE — H&P ADULT - ATTENDING COMMENTS
78F Hx HTN, Parkinson Disease found down on the floor since Tuesday (x 5 days) by neighbor OCTAVIANO with limited medical history was Hypothermia [age] y/o F with PMH of Parkinson's disease, HTN, initially brought in by EMS after being found down on the floor by a neighbor. Per EMS, she fell on Tuesday and was down on the floor since then, was using bottle to urinate and defecate into while eating tinned sardines. Pt reports she has been taking medications but unsure which ones, per EMS there was an old bottle of losartan at her home.       In ED, pt received 2L NS bolus and 1L LR bolus, vanc, zosyn, solumedrol 125 mg IV, heparin gtt, later started on levo gtt and given albumin 5% 250cc  CT A/P shows age-indeterminate compression fractures at L2, L3, and L4 and small peripheral PE. 78F Hx HTN, Parkinson Disease found down on the floor since Tuesday (x 5 days) by neighbor OCTAVIANO with limited medical history p/w ED  Hypothermia 37.6*F, Hypotension 60/40 mmHg, Dehydration, Hypernatremia s/p 3 Li IVF resuscitation started on IV Pressor.   - Admit to MICU for Septic Shock iso Hypothermia, High Anion Gap Metabolic Acidosis  - A&O x 2 and FC x 4 answering questions CTH negative     - RAO2 with CTA small segmental PE without RV Strain started on IV Hep Gtt   - Hypothermia and Hypotension resolved on Pressor Support    - Wean off IV Levophed Gtt 0.15 mcg titration to SBP >90 or MAP > 65 mmHg   - Hypernatremia, HAG-MA and Starvation Ketosis, ARF w/u   - Serial CBC, CMP, CPK, U'Lytes, Lactate, Ketone and Urine Output   - DVT PPx - IV Hep Gtt   - GOC - Full Code       Patient seen and examined with ICU Resident/Fellow at bedside after lab data, medical records and radiology reports reviewed. I have read and agreeable in general with resident's Documented Note, Assessment and Management Plan which reflected my opinions from bedside round and discussion.   Total Critical Care Time = 45 Min excluding teaching and procedure activity. 78F Hx HTN, Parkinson Disease found down on the floor since Tuesday (x 5 days) by neighbor OCTAVIANO with limited medical history p/w ED  Hypothermia 37.6*F, Hypotension 60/40 mmHg, Dehydration, Hypernatremia s/p 3 Li IVF resuscitation started on IV Pressor.   - Admit to MICU for Septic Shock iso Hypothermia, High Anion Gap Metabolic Acidosis  - A&O x 2 and FC x 4 answering questions CTH negative     - RAO2 with CTA small segmental PE without RV Strain started on IV Hep Gtt   - Hypothermia and Hypotension resolved on Pressor Support    - Wean off IV Levophed Gtt 0.15 mcg titration to SBP >90 or MAP > 65 mmHg   - Hypernatremia, HAG-MA and Starvation Ketosis vs. Euglycemic DKA, ARF w/u   - Serial CBC, CMP, CPK, U'Lytes, Ketone, Lactate, Ketone and Urine Output   - DVT PPx - IV Hep Gtt   - GOC - Full Code       Patient seen and examined with ICU Resident/Fellow at bedside after lab data, medical records and radiology reports reviewed. I have read and agreeable in general with resident's Documented Note, Assessment and Management Plan which reflected my opinions from bedside round and discussion.   Total Critical Care Time = 45 Min excluding teaching and procedure activity. 78F Hx HTN, Parkinson Disease found down on the floor since Tuesday (x 5 days) by neighbor OCTAVIANO with limited medical history p/w ED  Hypothermia 37.6*F, Hypotension 60/40 mmHg, Dehydration, Hypernatremia s/p 3 Li IVF resuscitation started on IV Pressor.   - Admit to MICU for Septic Shock iso Hypothermia, High Anion Gap Metabolic Acidosis  - A&O x 2-3 and FC x 4 answering questions with CTH negative     - RAO2 with CTA small segmental PE without RV Strain starting IV Hep Gtt   - Hypothermia and Hypotension resolved on Pressor Support  pending POCUS/TTE  - Wean off IV Levophed Gtt 0.15 mcg titration to SBP >90 or MAP > 65 mmHg   - Hypernatremia, HAG-MA, Starvation Ketosis vs. Euglycemic DKA, SILVANO f/u   - Serial CBC, CMP, CPK, U'Lytes, Ketone, Lactate, Ketone and Urine Output   - DVT PPx - IV Hep Gtt   - GOC - Full Code       Patient seen and examined with ICU Resident/Fellow at bedside after lab data, medical records and radiology reports reviewed. I have read and agreeable in general with resident's Documented Note, Assessment and Management Plan which reflected my opinions from bedside round and discussion.   Total Critical Care Time = 45 Min excluding teaching and procedure activity. 78F Hx HTN, Parkinson Disease found down on the floor since Tuesday (x 5 days) by neighbor OCTAVIANO with limited medical history p/w ED  Hypothermia 37.6*F, Hypotension 60/40 mmHg, Dehydration, Hypernatremia s/p 3 Li IVF resuscitation started on IV Pressor.   - Admit to MICU for Septic Shock iso Hypothermia, High Anion Gap Metabolic Acidosis  - A&O x 2-3 and FC x 4 answering questions with CTH negative     - RAO2 with CTA small segmental PE without RV Strain starting IV Hep Gtt   - Hypothermia and Hypotension resolved on Pressor Support    - Bedside POCUS, TTE and DVT Study  - Wean off IV Levophed Gtt 0.15 mcg titration to SBP >90 or MAP > 65 mmHg   - Hypernatremia, HAG-MA, Rhabdo, SILVANO, Starvation Ketosis vs. Euglycemic DKA f/u   - Serial CBC, CMP, CPK, U'Lytes, Ketone, Lactate, Ketone and Urine Output   - DVT PPx - IV Hep Gtt   - GOC - Full Code       Patient seen and examined with ICU Resident/Fellow at bedside after lab data, medical records and radiology reports reviewed. I have read and agreeable in general with resident's Documented Note, Assessment and Management Plan which reflected my opinions from bedside round and discussion.   Total Critical Care Time = 45 Min excluding teaching and procedure activity.

## 2024-03-24 NOTE — CONSULT NOTE ADULT - ASSESSMENT
78F PMH Parkinson's disease, HTN, admitted to MICU after being found down, clinically improved after fluid resuscitation. Vascular Surgery consulted for incidentally noted intravascular air in R IJ and SCV w/overlying subcutaneous air. Likely iatrogenic due to RUE PIV attempts.    No Vascular Surgery intervention necessary  If clinically concerned for soft tissue infection of neck at site of subcutaneous air noted on CT recommend ENT consult    Please call back with any further questions or concerns.    Discussed with Dr. Metzger (Fellow).    Vascular Surgery [id number]

## 2024-03-24 NOTE — H&P ADULT - HISTORY OF PRESENT ILLNESS
79 y/o F with PMH of Parkinson's disease, HTN, initially brought in by EMS after being found down on the floor by a neighbor. Per EMS, she fell on Tuesday and was down on the floor since then, was using bottle to urinate and defecate into while eating tinned sardines. Pt reports she has been taking medications but unsure which ones, per EMS there was an old bottle of losartan at her home.       In ED, pt received 2L NS bolus and 1L LR bolus, vanc, zosyn, solumedrol 125 mg IV, heparin gtt, later started on levo gtt and given albumin 5% 250cc  CT A/P shows age-indeterminate compression fractures at L2, L3, and L4 and small peripheral PE.   77 y/o F with PMH of Parkinson's disease, HTN, initially brought in by EMS after being found down on the floor by a neighbor. Per EMS, she fell on Tuesday and was down on the floor since then, was using bottle to urinate and defecate into while eating tinned sardines. Pt reports she has been taking medications but unsure which ones, per EMS there was an old bottle of losartan at her home.     Pt AAOx4, reports that she remembers initial fall that occurred on Tuesday. She was reorganizing some of her things and slipped, fell, and a box fell on her. She felt like she could not get up after because she could not balance herself. Denies head strike LOC, but does not remember EMS entering her apartment and bringing her out on a stretcher. Denies hx of T2DM, heart disease but does report an issue with her heart valve that she was evaluated for outpatient but reports that it was "fine" and that she did not need surgery. Pt reports she only takes valsartan and furosemide at home, does not remember dosages.    In ED, pt received 2L NS bolus and 1L LR bolus, vanc, zosyn, solumedrol 125 mg IV, heparin gtt, later started on levo gtt and given albumin 5% 250cc  CT Head unremarkable, CT C-spine shows age-indeterminate T1, T2, T3 endplate concavity, and R sided intravenous air including R subclavian and IJ veins extending into deep tissues of neck.      CT Chest/A/P: age-indeterminate mild L2 and moderate L3-L4 compression deformities, no fx. Subsegmental pulmonary arterial filling defect suggesting very small peripheral PE. Dilated pulmonary trunk compatible with chronic pulmonary arterial hypertension. No R heart strain.

## 2024-03-24 NOTE — H&P ADULT - NSHPSOCIALHISTORY_GEN_ALL_CORE
Lives at home alone, uses grocery cart to ambulate as a walker  Prior smoker, smoked for many years but quit  No alcohol or drug use.

## 2024-03-24 NOTE — CHART NOTE - NSCHARTNOTEFT_GEN_A_CORE
: Enrique Aguillon    INDICATION:     PROCEDURE:  [X] LIMITED ECHO  [X] LIMITED CHEST  [] LIMITED RETROPERITONEAL  [] LIMITED ABDOMINAL  [] LIMITED DVT  [] NEEDLE GUIDANCE VASCULAR  [] NEEDLE GUIDANCE THORACENTESIS  [] NEEDLE GUIDANCE PARACENTESIS  [] NEEDLE GUIDANCE PERICARDIOCENTESIS  [] OTHER    FINDINGS/INTERPRETATION:    Cardiac  Findings: LV>RV. Grossly normal LV systolic function. Trace MR. No pericardial effusion. IVC 1.3cm and collapsable  Interpretation: Good systolic function and small IVC, pt can likely tolerate more fluids    Lung  Findings: B/L A line pattern bilaterally with lung sliding. No right sided pleural effusion.  Interpretation: No pneumothorax, no signs of volume overload. Pt can likely tolerate more fluids    To be discussed with fellow/attending

## 2024-03-24 NOTE — CONSULT NOTE ADULT - ASSESSMENT
77 y/o F with PMH of Parkinson's disease, HTN, initially brought in by EMS after being found down on the floor by a neighbor. Pt found to be hypothermic, hypotensive, and dehydrated, transferred to MICU for further management of shock, likely hypovolemic vs septic, requiring levophed gtt. Pt taken off of pressors 15 minutes after arrival to MICU. Pt was started on IVF for volume resuscitation and RUTH for starvation ketosis. Her mental status improved with IVF. On heparin drip for possible new afib and PE. Pt now downgraded to the floor.  ENT called for incidental findings on CT of free air from rubclavian vein to R AJ and right neck. Pt without any discomfort, pain, denies dysphagia, odynophagia, dysphonia, sob, dyspnea, changes in voice or inability to tolerated secretions. Pt deferring any further work up 2/2 she feels fine, also states she is tolerating regular diet. WBC 14 from 16

## 2024-03-24 NOTE — CONSULT NOTE ADULT - PROBLEM SELECTOR RECOMMENDATION 9
Name band; Pt discussed in detail with Dr. Aguilar, plan to   - pt refusing indirect laryngoscopy at this time   - chest xray   - consider barium swallow to r/o perf, however pt already on regular diet without any issues   - call ent prn

## 2024-03-24 NOTE — CHART NOTE - NSCHARTNOTEFT_GEN_A_CORE
Attending: Dr. Tapia  : Reta Hernandez    INDICATION: Shock     PROCEDURE:  [X] LIMITED ECHO  [X] LIMITED CHEST  [ ] LIMITED RETROPERITONEAL  [] LIMITED ABDOMINAL  [ ] LIMITED DVT  [ ] NEEDLE GUIDANCE VASCULAR  [ ] NEEDLE GUIDANCE THORACENTESIS  [ ] NEEDLE GUIDANCE PARACENTESIS  [ ] NEEDLE GUIDANCE PERICARDIOCENTESIS  [ ] OTHER    FINDINGS:   Chest: Normal lung sliding.  A lines in bilateral anterior lung fields No pleural effusions noted Small left base consolidation seen   Echo: Good LV systolic function -  RV< LV, IVC estimated at 1.9 cm, < 50% collapsibility w/ respiration No pericardial effusions noted       INTERPRETATION:   IVC 1.9 cm with < 50% respiratory variability A line predominant no effusions noted may be fluid responsive Attending: Dr. Tapia  : Reta Rios    INDICATION: Shock     PROCEDURE:  [X] LIMITED ECHO  [X] LIMITED CHEST  [ ] LIMITED RETROPERITONEAL  [] LIMITED ABDOMINAL  [ ] LIMITED DVT  [ ] NEEDLE GUIDANCE VASCULAR  [ ] NEEDLE GUIDANCE THORACENTESIS  [ ] NEEDLE GUIDANCE PARACENTESIS  [ ] NEEDLE GUIDANCE PERICARDIOCENTESIS  [ ] OTHER    FINDINGS:   Chest: Normal lung sliding.  A lines in bilateral anterior lung fields No pleural effusions noted Small left base consolidation seen   Echo: Good LV systolic function -  RV< LV, IVC estimated at 1.9 cm, < 50% collapsibility w/ respiration No pericardial effusions noted       INTERPRETATION:   IVC 1.9 cm with < 50% respiratory variability A line predominant no effusions noted may be fluid responsive

## 2024-03-24 NOTE — PROGRESS NOTE ADULT - ASSESSMENT
77 y/o F with PMH of Parkinson's disease, HTN, initially brought in by EMS after being found down on the floor by a neighbor. Pt found to be hypothermic, hypotensive, and dehydrated, transferred to MICU for further management of shock, likely hypovolemic vs septic, requiring levophed gtt. Pt taken off of pressors 15 minutes after arrival to MICU. Pt was started on IVF for volume resuscitation and RUTH for starvation ketosis. Her mental status improved with IVF. On heparin drip for possible new afib and PE. Pt now downgraded to the floor.

## 2024-03-24 NOTE — PATIENT PROFILE ADULT - FALL HARM RISK - HARM RISK INTERVENTIONS

## 2024-03-24 NOTE — PROGRESS NOTE ADULT - SUBJECTIVE AND OBJECTIVE BOX
INTERVAL HPI/OVERNIGHT EVENTS:    SUBJECTIVE: Patient seen and examined at bedside.       VITAL SIGNS:  ICU Vital Signs Last 24 Hrs  T(C): 36.6 (24 Mar 2024 04:00), Max: 37.6 (24 Mar 2024 00:01)  T(F): 97.8 (24 Mar 2024 04:00), Max: 99.6 (24 Mar 2024 00:01)  HR: 75 (24 Mar 2024 08:00) (69 - 120)  BP: 118/58 (24 Mar 2024 08:00) (57/39 - 168/97)  BP(mean): 83 (24 Mar 2024 08:00) (45 - 122)  ABP: --  ABP(mean): --  RR: 15 (24 Mar 2024 08:00) (12 - 27)  SpO2: 100% (24 Mar 2024 08:00) (88% - 100%)    O2 Parameters below as of 24 Mar 2024 08:00  Patient On (Oxygen Delivery Method): room air            Plateau pressure:   P/F ratio:     03-23 @ 07:01  -  03-24 @ 07:00  --------------------------------------------------------  IN: 390 mL / OUT: 2025 mL / NET: -1635 mL    03-24 @ 07:01  -  03-24 @ 09:26  --------------------------------------------------------  IN: 230 mL / OUT: 75 mL / NET: 155 mL      CAPILLARY BLOOD GLUCOSE      POCT Blood Glucose.: 187 mg/dL (23 Mar 2024 23:36)      PHYSICAL EXAM:     VITALS:   T(C): 36.6 (03-24-24 @ 04:00), Max: 37.6 (03-24-24 @ 00:01)  HR: 75 (03-24-24 @ 08:00) (69 - 120)  BP: 118/58 (03-24-24 @ 08:00) (57/39 - 168/97)  RR: 15 (03-24-24 @ 08:00) (12 - 27)  SpO2: 100% (03-24-24 @ 08:00) (88% - 100%)    GENERAL: NAD, lying in bed comfortably  HEAD:  Atraumatic, Normocephalic  EYES: EOMI, PERRLA, conjunctiva and sclera clear  ENT: Moist mucous membranes  NECK: Supple, No JVD  CHEST/LUNG: CTABL; No rales, rhonchi, wheezing, or rubs. Unlabored respirations  HEART: RRR. No M/R/G  ABDOMEN: Soft, nontender, non-distended, normoactive BS. No hepatomegaly  EXTREMITIES:  2+ Peripheral Pulses, brisk capillary refill. No clubbing, cyanosis, or edema  NERVOUS SYSTEM:  Alert & Oriented X3, speech clear. No deficits, CN II-XII intact. Normal sensation   MSK: FROM all 4 extremities, full and equal strength  PSYCH: Normal affect, normal speech, normal behavior  SKIN: No rashes or lesions      MEDICATIONS:  MEDICATIONS  (STANDING):  dextrose 5% + lactated ringers. 1000 milliLiter(s) (100 mL/Hr) IV Continuous <Continuous>  heparin  Infusion. 1500 Unit(s)/Hr (15 mL/Hr) IV Continuous <Continuous>  influenza  Vaccine (HIGH DOSE) 0.7 milliLiter(s) IntraMuscular once  norepinephrine Infusion 0.05 MICROgram(s)/kG/Min (6.27 mL/Hr) IV Continuous <Continuous>    MEDICATIONS  (PRN):      ALLERGIES:  Allergies    No Known Allergies    Intolerances        LABS:                        11.5   16.51 )-----------( 312      ( 24 Mar 2024 02:59 )             32.5     03-24    150<H>  |  111<H>  |  32<H>  ----------------------------<  200<H>  3.8   |  18<L>  |  1.23    Ca    9.0      24 Mar 2024 02:59  Phos  5.1     03-24  Mg     2.3     03-24    TPro  6.2  /  Alb  3.7  /  TBili  1.0  /  DBili  x   /  AST  89<H>  /  ALT  40  /  AlkPhos  76  03-24    PT/INR - ( 24 Mar 2024 02:59 )   PT: 12.0 sec;   INR: 1.09 ratio         PTT - ( 24 Mar 2024 02:59 )  PTT:93.3 sec  Urinalysis Basic - ( 24 Mar 2024 02:59 )    Color: x / Appearance: x / SG: x / pH: x  Gluc: 200 mg/dL / Ketone: x  / Bili: x / Urobili: x   Blood: x / Protein: x / Nitrite: x   Leuk Esterase: x / RBC: x / WBC x   Sq Epi: x / Non Sq Epi: x / Bacteria: x        IMAGING:    EKG: INTERVAL HPI/OVERNIGHT EVENTS: Admitted to MICU    SUBJECTIVE: Patient seen and examined at bedside. Pt does not fully recall events prior to hospitalization and is not a reliable historian with regards to her medical history. She feels thirsty.      VITAL SIGNS:  ICU Vital Signs Last 24 Hrs  T(C): 36.6 (24 Mar 2024 04:00), Max: 37.6 (24 Mar 2024 00:01)  T(F): 97.8 (24 Mar 2024 04:00), Max: 99.6 (24 Mar 2024 00:01)  HR: 75 (24 Mar 2024 08:00) (69 - 120)  BP: 118/58 (24 Mar 2024 08:00) (57/39 - 168/97)  BP(mean): 83 (24 Mar 2024 08:00) (45 - 122)  ABP: --  ABP(mean): --  RR: 15 (24 Mar 2024 08:00) (12 - 27)  SpO2: 100% (24 Mar 2024 08:00) (88% - 100%)    O2 Parameters below as of 24 Mar 2024 08:00  Patient On (Oxygen Delivery Method): room air        Plateau pressure:   P/F ratio:     03-23 @ 07:01  -  03-24 @ 07:00  --------------------------------------------------------  IN: 390 mL / OUT: 2025 mL / NET: -1635 mL    03-24 @ 07:01  -  03-24 @ 09:26  --------------------------------------------------------  IN: 230 mL / OUT: 75 mL / NET: 155 mL      CAPILLARY BLOOD GLUCOSE      POCT Blood Glucose.: 187 mg/dL (23 Mar 2024 23:36)      PHYSICAL EXAM:     VITALS:   T(C): 36.6 (03-24-24 @ 04:00), Max: 37.6 (03-24-24 @ 00:01)  HR: 75 (03-24-24 @ 08:00) (69 - 120)  BP: 118/58 (03-24-24 @ 08:00) (57/39 - 168/97)  RR: 15 (03-24-24 @ 08:00) (12 - 27)  SpO2: 100% (03-24-24 @ 08:00) (88% - 100%)    GENERAL: NAD, lying in bed comfortably, slightly unkept  EYES: EOMI, PERRLA, conjunctiva and sclera clear  ENT: Dry mucous membranes  NECK: Supple, No JVD  CHEST/LUNG: CTABL; No crackles or wheezing  HEART: RRR. No murmurs  ABDOMEN: Soft, nontender, non-distended, normoactive BS. No hepatomegaly  EXTREMITIES: LLE with edema > R  NERVOUS SYSTEM: Alert & Oriented X3, speech clear. Cogwheeling and mask like facies  PSYCH: Normal affect, normal speech, normal behavior  SKIN: Fungal toe nails      MEDICATIONS:  MEDICATIONS  (STANDING):  dextrose 5% + lactated ringers. 1000 milliLiter(s) (100 mL/Hr) IV Continuous <Continuous>  heparin  Infusion. 1500 Unit(s)/Hr (15 mL/Hr) IV Continuous <Continuous>  influenza  Vaccine (HIGH DOSE) 0.7 milliLiter(s) IntraMuscular once  norepinephrine Infusion 0.05 MICROgram(s)/kG/Min (6.27 mL/Hr) IV Continuous <Continuous>    MEDICATIONS  (PRN):      ALLERGIES:  Allergies    No Known Allergies    Intolerances        LABS:                        11.5   16.51 )-----------( 312      ( 24 Mar 2024 02:59 )             32.5     03-24    150<H>  |  111<H>  |  32<H>  ----------------------------<  200<H>  3.8   |  18<L>  |  1.23    Ca    9.0      24 Mar 2024 02:59  Phos  5.1     03-24  Mg     2.3     03-24    TPro  6.2  /  Alb  3.7  /  TBili  1.0  /  DBili  x   /  AST  89<H>  /  ALT  40  /  AlkPhos  76  03-24    PT/INR - ( 24 Mar 2024 02:59 )   PT: 12.0 sec;   INR: 1.09 ratio         PTT - ( 24 Mar 2024 02:59 )  PTT:93.3 sec  Urinalysis Basic - ( 24 Mar 2024 02:59 )    Color: x / Appearance: x / SG: x / pH: x  Gluc: 200 mg/dL / Ketone: x  / Bili: x / Urobili: x   Blood: x / Protein: x / Nitrite: x   Leuk Esterase: x / RBC: x / WBC x   Sq Epi: x / Non Sq Epi: x / Bacteria: x

## 2024-03-24 NOTE — H&P ADULT - NSHPLABSRESULTS_GEN_ALL_CORE
Personally reviewed labs, imaging, ekg                           10.5   15.70 )-----------( 281      ( 23 Mar 2024 21:34 )             31.1       03-23    147<H>  |  110<H>  |  33<H>  ----------------------------<  524<HH>  3.8   |  15<L>  |  1.17    Ca    8.5      23 Mar 2024 21:34  Phos  3.8     03-23  Mg     2.6     03-23    TPro  6.7  /  Alb  3.9  /  TBili  1.2  /  DBili  x   /  AST  96<H>  /  ALT  39  /  AlkPhos  90  03-23              Urinalysis Basic - ( 23 Mar 2024 21:34 )    Color: x / Appearance: x / SG: x / pH: x  Gluc: 524 mg/dL / Ketone: x  / Bili: x / Urobili: x   Blood: x / Protein: x / Nitrite: x   Leuk Esterase: x / RBC: x / WBC x   Sq Epi: x / Non Sq Epi: x / Bacteria: x        PT/INR - ( 23 Mar 2024 10:48 )   PT: 11.8 sec;   INR: 1.07 ratio         PTT - ( 23 Mar 2024 21:34 )  PTT:22.9 sec    Lactate Trend      CARDIAC MARKERS ( 23 Mar 2024 10:48 )  x     / x     / 1565 U/L / x     / x            CAPILLARY BLOOD GLUCOSE      POCT Blood Glucose.: 187 mg/dL (23 Mar 2024 23:36)

## 2024-03-25 DIAGNOSIS — R93.89 ABNORMAL FINDINGS ON DIAGNOSTIC IMAGING OF OTHER SPECIFIED BODY STRUCTURES: ICD-10-CM

## 2024-03-25 LAB
ALBUMIN SERPL ELPH-MCNC: 3 G/DL — LOW (ref 3.3–5)
ALP SERPL-CCNC: 52 U/L — SIGNIFICANT CHANGE UP (ref 40–120)
ALT FLD-CCNC: 35 U/L — SIGNIFICANT CHANGE UP (ref 10–45)
ANION GAP SERPL CALC-SCNC: 12 MMOL/L — SIGNIFICANT CHANGE UP (ref 5–17)
APTT BLD: 104.9 SEC — HIGH (ref 24.5–35.6)
APTT BLD: 155.4 SEC — CRITICAL HIGH (ref 24.5–35.6)
APTT BLD: 61.8 SEC — HIGH (ref 24.5–35.6)
AST SERPL-CCNC: 65 U/L — HIGH (ref 10–40)
B-OH-BUTYR SERPL-SCNC: 0.1 MMOL/L — SIGNIFICANT CHANGE UP
BASOPHILS # BLD AUTO: 0 K/UL — SIGNIFICANT CHANGE UP (ref 0–0.2)
BASOPHILS NFR BLD AUTO: 0 % — SIGNIFICANT CHANGE UP (ref 0–2)
BILIRUB SERPL-MCNC: 0.7 MG/DL — SIGNIFICANT CHANGE UP (ref 0.2–1.2)
BUN SERPL-MCNC: 19 MG/DL — SIGNIFICANT CHANGE UP (ref 7–23)
CALCIUM SERPL-MCNC: 8.5 MG/DL — SIGNIFICANT CHANGE UP (ref 8.4–10.5)
CHLORIDE SERPL-SCNC: 111 MMOL/L — HIGH (ref 96–108)
CO2 SERPL-SCNC: 25 MMOL/L — SIGNIFICANT CHANGE UP (ref 22–31)
CREAT SERPL-MCNC: 0.95 MG/DL — SIGNIFICANT CHANGE UP (ref 0.5–1.3)
EGFR: 62 ML/MIN/1.73M2 — SIGNIFICANT CHANGE UP
EOSINOPHIL # BLD AUTO: 0.01 K/UL — SIGNIFICANT CHANGE UP (ref 0–0.5)
EOSINOPHIL NFR BLD AUTO: 0.1 % — SIGNIFICANT CHANGE UP (ref 0–6)
GLUCOSE SERPL-MCNC: 102 MG/DL — HIGH (ref 70–99)
HCT VFR BLD CALC: 27.7 % — LOW (ref 34.5–45)
HGB BLD-MCNC: 9.7 G/DL — LOW (ref 11.5–15.5)
IMM GRANULOCYTES NFR BLD AUTO: 0.5 % — SIGNIFICANT CHANGE UP (ref 0–0.9)
LYMPHOCYTES # BLD AUTO: 1.6 K/UL — SIGNIFICANT CHANGE UP (ref 1–3.3)
LYMPHOCYTES # BLD AUTO: 17.4 % — SIGNIFICANT CHANGE UP (ref 13–44)
MCHC RBC-ENTMCNC: 29.9 PG — SIGNIFICANT CHANGE UP (ref 27–34)
MCHC RBC-ENTMCNC: 35 GM/DL — SIGNIFICANT CHANGE UP (ref 32–36)
MCV RBC AUTO: 85.5 FL — SIGNIFICANT CHANGE UP (ref 80–100)
MONOCYTES # BLD AUTO: 0.67 K/UL — SIGNIFICANT CHANGE UP (ref 0–0.9)
MONOCYTES NFR BLD AUTO: 7.3 % — SIGNIFICANT CHANGE UP (ref 2–14)
NEUTROPHILS # BLD AUTO: 6.85 K/UL — SIGNIFICANT CHANGE UP (ref 1.8–7.4)
NEUTROPHILS NFR BLD AUTO: 74.7 % — SIGNIFICANT CHANGE UP (ref 43–77)
NRBC # BLD: 0 /100 WBCS — SIGNIFICANT CHANGE UP (ref 0–0)
PLATELET # BLD AUTO: 231 K/UL — SIGNIFICANT CHANGE UP (ref 150–400)
POTASSIUM SERPL-MCNC: 3 MMOL/L — LOW (ref 3.5–5.3)
POTASSIUM SERPL-SCNC: 3 MMOL/L — LOW (ref 3.5–5.3)
PROT SERPL-MCNC: 5 G/DL — LOW (ref 6–8.3)
RBC # BLD: 3.24 M/UL — LOW (ref 3.8–5.2)
RBC # FLD: 13.9 % — SIGNIFICANT CHANGE UP (ref 10.3–14.5)
SODIUM SERPL-SCNC: 148 MMOL/L — HIGH (ref 135–145)
WBC # BLD: 9.18 K/UL — SIGNIFICANT CHANGE UP (ref 3.8–10.5)
WBC # FLD AUTO: 9.18 K/UL — SIGNIFICANT CHANGE UP (ref 3.8–10.5)

## 2024-03-25 PROCEDURE — 93970 EXTREMITY STUDY: CPT | Mod: 26

## 2024-03-25 PROCEDURE — 76705 ECHO EXAM OF ABDOMEN: CPT | Mod: 26

## 2024-03-25 RX ORDER — POTASSIUM CHLORIDE 20 MEQ
40 PACKET (EA) ORAL ONCE
Refills: 0 | Status: COMPLETED | OUTPATIENT
Start: 2024-03-25 | End: 2024-03-25

## 2024-03-25 RX ADMIN — HEPARIN SODIUM 900 UNIT(S)/HR: 5000 INJECTION INTRAVENOUS; SUBCUTANEOUS at 07:24

## 2024-03-25 RX ADMIN — HEPARIN SODIUM 800 UNIT(S)/HR: 5000 INJECTION INTRAVENOUS; SUBCUTANEOUS at 16:56

## 2024-03-25 RX ADMIN — HEPARIN SODIUM 800 UNIT(S)/HR: 5000 INJECTION INTRAVENOUS; SUBCUTANEOUS at 19:25

## 2024-03-25 RX ADMIN — CEFTRIAXONE 100 MILLIGRAM(S): 500 INJECTION, POWDER, FOR SOLUTION INTRAMUSCULAR; INTRAVENOUS at 11:40

## 2024-03-25 RX ADMIN — HEPARIN SODIUM 800 UNIT(S)/HR: 5000 INJECTION INTRAVENOUS; SUBCUTANEOUS at 12:51

## 2024-03-25 RX ADMIN — AZITHROMYCIN 255 MILLIGRAM(S): 500 TABLET, FILM COATED ORAL at 12:45

## 2024-03-25 RX ADMIN — Medication 40 MILLIEQUIVALENT(S): at 17:48

## 2024-03-25 RX ADMIN — HEPARIN SODIUM 900 UNIT(S)/HR: 5000 INJECTION INTRAVENOUS; SUBCUTANEOUS at 03:26

## 2024-03-25 RX ADMIN — HEPARIN SODIUM 0 UNIT(S)/HR: 5000 INJECTION INTRAVENOUS; SUBCUTANEOUS at 02:18

## 2024-03-25 NOTE — CONSULT NOTE ADULT - ASSESSMENT
Assessment:   77 y/o F with PMH of Parkinson's disease, HTN p/w hypothermia, hypotension s/p ?fall found on the floor by neighbor. Pt initially admitted to MICU for hypovolemic vs septic shock, temporary Levophed gtt. She was downgraded to medical floor s/p IVF resuscitation. Noted to have Afib and found to have PE. Pulmonology consulted to PE.     CT scan of the chest showed small segmental PE, Trop peaked to 153 and then downtrended. Pro-BNP 3354. Echo showed no signs of RV failure, slight increased LV wall thickness, with EF ~65% w/o regional wall abnormalities, normal RV, Pul systolic pressure 25 mmHg, TAPSE 2.9 cm, mildly dilated LA, mild TR. LE doppler US showed no evidence of deep venous thrombosis in the right lower extremity. 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 Currently on heparin drip.   Based on the pt's history, imaging, likely provoked PE in the setting of immobilization however can not rule out underlying malignancy or other etiologies as underlying cause of PE. PESI score: 77 points, class II [low risk :1.7-3.5% 30 day mortality].     PLAN:   - recommend transitioning to Eliquis 10 mg BID for 7 days followed by 5mg BID thereafter [normal SCr, CrCl ~49, Age <80, weight>60kg]  - Rapid HIV test  - oupt sleep study to rule for sleep apnea  - follow up with heme/onc outpt    Assessment:   77 y/o F with PMH of Parkinson's disease, HTN p/w hypothermia, hypotension s/p ?fall found on the floor by neighbor. Pt initially admitted to MICU for hypovolemic vs septic shock, temporary Levophed gtt. She was downgraded to medical floor s/p IVF resuscitation. Noted to have Afib and found to have PE. Pulmonology consulted to PE.     CT scan of the chest showed small segmental PE, Trop peaked to 153 and then downtrended. Pro-BNP 3354. Echo showed no signs of RV failure, slight increased LV wall thickness, with EF ~65% w/o regional wall abnormalities, normal RV, Pul systolic pressure 25 mmHg, TAPSE 2.9 cm, mildly dilated LA, mild TR. LE doppler US showed no evidence of deep venous thrombosis in the right lower extremity. 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 Currently on heparin drip.   Based on the pt's history, imaging, likely provoked PE in the setting of immobilization however can not rule out underlying malignancy or other etiologies as underlying cause of PE. PESI score: 77 points, class II [low risk :1.7-3.5% 30 day mortality].     PLAN:   - recommend transitioning to Eliquis 10 mg BID for 7 days followed by 5mg BID thereafter [normal SCr, CrCl ~49, Age <80, weight>60kg]  - Rapid HIV test  - oupt sleep study to rule out sleep apnea  - follow up with heme/onc outpt for hypercoagulable studies   - age appropriate cancer screening

## 2024-03-25 NOTE — PROGRESS NOTE ADULT - SUBJECTIVE AND OBJECTIVE BOX
Patient is a 78y old  Female who presents with a chief complaint of hypotension (24 Mar 2024 17:38)      SUBJECTIVE / OVERNIGHT EVENTS: Pt seen and examined. No acute events overnight. She denies CP, SOB, cough, fever/chills, dysphagia.    MEDICATIONS  (STANDING):  azithromycin  IVPB      azithromycin  IVPB 500 milliGRAM(s) IV Intermittent every 24 hours  cefTRIAXone   IVPB 1000 milliGRAM(s) IV Intermittent every 24 hours  dextrose 5% + lactated ringers. 1000 milliLiter(s) (100 mL/Hr) IV Continuous <Continuous>  heparin  Infusion. 1500 Unit(s)/Hr (15 mL/Hr) IV Continuous <Continuous>  influenza  Vaccine (HIGH DOSE) 0.7 milliLiter(s) IntraMuscular once  potassium chloride    Tablet ER 40 milliEquivalent(s) Oral once    MEDICATIONS  (PRN):      Vital Signs Last 24 Hrs  T(C): 37.2 (25 Mar 2024 13:29), Max: 37.3 (24 Mar 2024 16:00)  T(F): 98.9 (25 Mar 2024 13:29), Max: 99.2 (24 Mar 2024 16:00)  HR: 93 (25 Mar 2024 13:29) (72 - 93)  BP: 110/66 (25 Mar 2024 13:29) (94/49 - 110/66)  BP(mean): 69 (24 Mar 2024 15:00) (69 - 69)  RR: 18 (25 Mar 2024 13:29) (16 - 18)  SpO2: 94% (25 Mar 2024 13:29) (94% - 100%)    Parameters below as of 25 Mar 2024 13:29  Patient On (Oxygen Delivery Method): room air      CAPILLARY BLOOD GLUCOSE        I&O's Summary    24 Mar 2024 07:01  -  25 Mar 2024 07:00  --------------------------------------------------------  IN: 1510 mL / OUT: 525 mL / NET: 985 mL        PHYSICAL EXAM:  GENERAL: NAD, frail  HEAD:  Atraumatic, Normocephalic  EYES: EOMI, PERRLA, conjunctiva and sclera clear  NECK: Supple, No JVD  CHEST/LUNG: Clear to auscultation bilaterally; No wheeze  HEART: Regular rate and rhythm; No murmurs, rubs, or gallops  ABDOMEN: Soft, Nontender, Nondistended; Bowel sounds present  EXTREMITIES:  2+ Peripheral Pulses, No clubbing, cyanosis, or edema  PSYCH: AAOx3  NEUROLOGY: non-focal  SKIN: b/l feet with severe onchomycosis    LABS:                        9.7    9.18  )-----------( 231      ( 25 Mar 2024 06:39 )             27.7     03-25    148<H>  |  111<H>  |  19  ----------------------------<  102<H>  3.0<L>   |  25  |  0.95    Ca    8.5      25 Mar 2024 06:39  Phos  3.7     03-24  Mg     2.1     03-24    TPro  5.0<L>  /  Alb  3.0<L>  /  TBili  0.7  /  DBili  x   /  AST  65<H>  /  ALT  35  /  AlkPhos  52  03-25    PT/INR - ( 24 Mar 2024 09:54 )   PT: 12.8 sec;   INR: 1.23 ratio         PTT - ( 25 Mar 2024 12:28 )  PTT:104.9 sec  CARDIAC MARKERS ( 24 Mar 2024 09:54 )  x     / x     / 1030 U/L / x     / x      CARDIAC MARKERS ( 24 Mar 2024 02:59 )  x     / x     / 1282 U/L / x     / x          Urinalysis Basic - ( 25 Mar 2024 06:39 )    Color: x / Appearance: x / SG: x / pH: x  Gluc: 102 mg/dL / Ketone: x  / Bili: x / Urobili: x   Blood: x / Protein: x / Nitrite: x   Leuk Esterase: x / RBC: x / WBC x   Sq Epi: x / Non Sq Epi: x / Bacteria: x          Consultant(s) Notes Reviewed:  ENT, Vascular Sx

## 2024-03-25 NOTE — PROVIDER CONTACT NOTE (CRITICAL VALUE NOTIFICATION) - ACTION/TREATMENT ORDERED:
Followed full anticoagulation nomogram as per PA. Delayed infusion for 1 hr & will decrease by 2 units as per nomogram

## 2024-03-25 NOTE — CONSULT NOTE ADULT - ATTENDING COMMENTS
78 year old female with history of PD (not on meds) presented with septic v. hypovolemic shock, found to have a small subsegmental PE for which pulmonary is consulted.   Mild troponinemia, although difficult to interpret in the setting of shock. Also with BNP >3k . Otherwise no signs of RV dysfunction on echocardiogram or imaging. She is not hypoxic, nor tachycardic and is otherwise hemodynamically stable.   Suspect this was an incidental finding more than a  of her decompensation on admission.   She is tolerating heparin drip without issue. There are no further procedures planned.     - If no futher procedure planned, would transition to [facility] for AC  - Ambulatory pulse ox  - PT/OT  - IS to bedside  - Acapella to facilitate airways clearance  - Defer further work up of routine cancer screening and hypercoagulable work up to outpatient setting  - Otherwise as above

## 2024-03-25 NOTE — CONSULT NOTE ADULT - SUBJECTIVE AND OBJECTIVE BOX
PULMONARY SERVICE CONSULT NOTE    HPI:  79 y/o F with PMH of Parkinson's disease, HTN, initially brought in by EMS after being found down on the floor by a neighbor. Per EMS, she fell on Tuesday and was down on the floor since then, was using bottle to urinate and defecate into while eating tinned sardines. Pt reports she has been taking medications but unsure which ones, per EMS there was an old bottle of losartan at her home.     Pt AAOx4, reports that she remembers initial fall that occurred on Tuesday. She was reorganizing some of her things and slipped, fell, and a box fell on her. She felt like she could not get up after because she could not balance herself. Denies head strike LOC, but does not remember EMS entering her apartment and bringing her out on a stretcher. Denies hx of T2DM, heart disease but does report an issue with her heart valve that she was evaluated for outpatient but reports that it was "fine" and that she did not need surgery. Pt reports she only takes valsartan and furosemide at home, does not remember dosages.    In ED, pt received 2L NS bolus and 1L LR bolus, vanc, zosyn, solumedrol 125 mg IV, heparin gtt, later started on levo gtt and given albumin 5% 250cc  CT Head unremarkable, CT C-spine shows age-indeterminate T1, T2, T3 endplate concavity, and R sided intravenous air including R subclavian and IJ veins extending into deep tissues of neck.      CT Chest/A/P: age-indeterminate mild L2 and moderate L3-L4 compression deformities, no fx. Subsegmental pulmonary arterial filling defect suggesting very small peripheral PE. Dilated pulmonary trunk compatible with chronic pulmonary arterial hypertension. No R heart strain. (24 Mar 2024 00:01)      Overnight/Interval history:   No acute overnight events. Pt seen and examined at bedside. She denied dyspnea, or other acute symptoms including chest pain, palpitation, headache, dizziness, abdominal pain, N/V/D, hematemesis, hemoptysis, melena or hematochezia.  She reported of being active prior to COVID time however then after was mainly immobile, used to walk using a walker however not anymore, mainly bedbound. Pt stated that she has parkinsonism, had previously followed with Cardiologist and was reported to have abnormal valvular function.       REVIEW OF SYSTEMS:  Constitutional: No fever, weight loss or fatigue  Eyes: No eye pain, visual disturbances, or discharge  ENMT:  No difficulty hearing, tinnitus, vertigo; No sinus or throat pain  Neck: No pain, stiffness or neck swelling  Respiratory No dyspnea, cough, wheezing, upper respiratory symptoms such as rhinorrhea   Cardiovascular: No chest pain, palpitations, dizziness, + bilateral LE swelling worse on the left with pain   Gastrointestinal: No abdominal or epigastric pain. No nausea, vomiting or hematemesis; No diarrhea or constipation. No melena or hematochezia.  Genitourinary: No dysuria, frequency, hematuria or incontinence  Neurological: No headaches, memory loss, loss of strength, numbness or tremors  Skin: No itching, burning, rashes or lesions   Musculoskeletal: No joint pain or swelling; No muscle, back or extremity pain  Heme/Lymph: No easy bruising or bleeding gums  Allergy and Immunologic: No hives or eczema    PAST MEDICAL & SURGICAL HISTORY:  H/O Parkinson's disease      HTN (hypertension)          FAMILY HISTORY:      SOCIAL HISTORY:  Smoking Status: [ ] Current, [ ] Former, [ ] Never  Pack Years:    MEDICATIONS:  Pulmonary:    Antimicrobials:  azithromycin  IVPB      azithromycin  IVPB 500 milliGRAM(s) IV Intermittent every 24 hours  cefTRIAXone   IVPB 1000 milliGRAM(s) IV Intermittent every 24 hours    Anticoagulants:  heparin  Infusion. 1500 Unit(s)/Hr IV Continuous <Continuous>    Onc:    GI/:    Endocrine:    Cardiac:    Other Medications:  dextrose 5% + lactated ringers. 1000 milliLiter(s) IV Continuous <Continuous>  influenza  Vaccine (HIGH DOSE) 0.7 milliLiter(s) IntraMuscular once  potassium chloride    Tablet ER 40 milliEquivalent(s) Oral once      Allergies    No Known Allergies    Intolerances        Vital Signs Last 24 Hrs  T(C): 37.2 (25 Mar 2024 13:29), Max: 37.3 (24 Mar 2024 16:00)  T(F): 98.9 (25 Mar 2024 13:29), Max: 99.2 (24 Mar 2024 16:00)  HR: 93 (25 Mar 2024 13:29) (72 - 93)  BP: 110/66 (25 Mar 2024 13:29) (95/59 - 110/66)  BP(mean): --  RR: 18 (25 Mar 2024 13:29) (18 - 18)  SpO2: 94% (25 Mar 2024 13:29) (94% - 98%)    Parameters below as of 25 Mar 2024 13:29  Patient On (Oxygen Delivery Method): room air        03-24 @ 07:01  -  03-25 @ 07:00  --------------------------------------------------------  IN: 1510 mL / OUT: 525 mL / NET: 985 mL          PHYSICAL EXAM:  General: not in distress, well-developed, engaging in conversation  Head: NC/AT  EENT: PERRL, anicteric sclera;   Neck: supple, no appreciable JVD  Respiratory: CTA B/L; no wheezing, rales or rhonchi noted   Cardiovascular: +S1/S2, did not appreciate murmur, distant sounds on all 4 points  Gastrointestinal: soft, NT/ND  Extremities: + bilateral peripheral pulses, onychomycosis with severe keratinization of the nails of all toes, +nonpitting bilateral LE edema, LLE tenderness upon deep palpation   Neurological: awake and alert; GUERRA    LABS:      CBC Full  -  ( 25 Mar 2024 06:39 )  WBC Count : 9.18 K/uL  RBC Count : 3.24 M/uL  Hemoglobin : 9.7 g/dL  Hematocrit : 27.7 %  Platelet Count - Automated : 231 K/uL  Mean Cell Volume : 85.5 fl  Mean Cell Hemoglobin : 29.9 pg  Mean Cell Hemoglobin Concentration : 35.0 gm/dL  Auto Neutrophil # : 6.85 K/uL  Auto Lymphocyte # : 1.60 K/uL  Auto Monocyte # : 0.67 K/uL  Auto Eosinophil # : 0.01 K/uL  Auto Basophil # : 0.00 K/uL  Auto Neutrophil % : 74.7 %  Auto Lymphocyte % : 17.4 %  Auto Monocyte % : 7.3 %  Auto Eosinophil % : 0.1 %  Auto Basophil % : 0.0 %    03-25    148<H>  |  111<H>  |  19  ----------------------------<  102<H>  3.0<L>   |  25  |  0.95    Ca    8.5      25 Mar 2024 06:39  Phos  3.7     03-24  Mg     2.1     03-24    TPro  5.0<L>  /  Alb  3.0<L>  /  TBili  0.7  /  DBili  x   /  AST  65<H>  /  ALT  35  /  AlkPhos  52  03-25    PT/INR - ( 24 Mar 2024 09:54 )   PT: 12.8 sec;   INR: 1.23 ratio         PTT - ( 25 Mar 2024 12:28 )  PTT:104.9 sec      Urinalysis Basic - ( 25 Mar 2024 06:39 )    Color: x / Appearance: x / SG: x / pH: x  Gluc: 102 mg/dL / Ketone: x  / Bili: x / Urobili: x   Blood: x / Protein: x / Nitrite: x   Leuk Esterase: x / RBC: x / WBC x   Sq Epi: x / Non Sq Epi: x / Bacteria: x                RADIOLOGY & ADDITIONAL STUDIES: PULMONARY SERVICE CONSULT NOTE    HPI:  77 y/o F with PMH of Parkinson's disease, HTN, initially brought in by EMS after being found down on the floor by a neighbor. Per EMS, she fell on Tuesday and was down on the floor since then, was using bottle to urinate and defecate into while eating tinned sardines. Pt reports she has been taking medications but unsure which ones, per EMS there was an old bottle of losartan at her home.     Pt AAOx4, reports that she remembers initial fall that occurred on Tuesday. She was reorganizing some of her things and slipped, fell, and a box fell on her. She felt like she could not get up after because she could not balance herself. Denies head strike LOC, but does not remember EMS entering her apartment and bringing her out on a stretcher. Denies hx of T2DM, heart disease but does report an issue with her heart valve that she was evaluated for outpatient but reports that it was "fine" and that she did not need surgery. Pt reports she only takes valsartan and furosemide at home, does not remember dosages.    In ED, pt received 2L NS bolus and 1L LR bolus, vanc, zosyn, solumedrol 125 mg IV, heparin gtt, later started on levo gtt and given albumin 5% 250cc  CT Head unremarkable, CT C-spine shows age-indeterminate T1, T2, T3 endplate concavity, and R sided intravenous air including R subclavian and IJ veins extending into deep tissues of neck.      CT Chest/A/P: age-indeterminate mild L2 and moderate L3-L4 compression deformities, no fx. Subsegmental pulmonary arterial filling defect suggesting very small peripheral PE. Dilated pulmonary trunk compatible with chronic pulmonary arterial hypertension. No R heart strain. (24 Mar 2024 00:01)      Overnight/Interval history:   No acute overnight events. Pt seen and examined at bedside. She denied dyspnea, or other acute symptoms including chest pain, palpitation, headache, dizziness, abdominal pain, N/V/D, hematemesis, hemoptysis, melena or hematochezia.  She reported of being active prior to COVID time however then after was mainly immobile, used to walk using a walker however not anymore, mainly bedbound. Pt stated that she has parkinsonism, had previously followed with Cardiologist and was reported to have abnormal valvular function.       REVIEW OF SYSTEMS:  Constitutional: No fever, + generalized weakness worsened since admission   Eyes: No eye pain, visual disturbances, or discharge  ENMT:  No vertigo; No sinus or throat pain  Neck: No pain, stiffness or neck swelling  Respiratory No dyspnea, cough, wheezing, upper respiratory symptoms such as rhinorrhea   Cardiovascular: No chest pain, palpitations, dizziness, + bilateral LE swelling worse on the left with pain   Gastrointestinal: No abdominal or epigastric pain. No nausea, vomiting or hematemesis; No diarrhea or constipation. No melena or hematochezia.  Genitourinary: No dysuria, frequency, hematuria or incontinence  Neurological: No headaches, numbness or tremors  Skin: No itching, burning, rashes or lesions   Heme/Lymph: No easy bruising or bleeding gums  Allergy and Immunologic: No hives or eczema    PAST MEDICAL & SURGICAL HISTORY:  H/O Parkinson's disease      HTN (hypertension)          FAMILY HISTORY:      SOCIAL HISTORY:  Smoking Status: [ ] Current, [ ] Former, [ ] Never  Pack Years:    MEDICATIONS:  Pulmonary:    Antimicrobials:  azithromycin  IVPB      azithromycin  IVPB 500 milliGRAM(s) IV Intermittent every 24 hours  cefTRIAXone   IVPB 1000 milliGRAM(s) IV Intermittent every 24 hours    Anticoagulants:  heparin  Infusion. 1500 Unit(s)/Hr IV Continuous <Continuous>    Onc:    GI/:    Endocrine:    Cardiac:    Other Medications:  dextrose 5% + lactated ringers. 1000 milliLiter(s) IV Continuous <Continuous>  influenza  Vaccine (HIGH DOSE) 0.7 milliLiter(s) IntraMuscular once  potassium chloride    Tablet ER 40 milliEquivalent(s) Oral once      Allergies    No Known Allergies    Intolerances        Vital Signs Last 24 Hrs  T(C): 37.2 (25 Mar 2024 13:29), Max: 37.3 (24 Mar 2024 16:00)  T(F): 98.9 (25 Mar 2024 13:29), Max: 99.2 (24 Mar 2024 16:00)  HR: 93 (25 Mar 2024 13:29) (72 - 93)  BP: 110/66 (25 Mar 2024 13:29) (95/59 - 110/66)  BP(mean): --  RR: 18 (25 Mar 2024 13:29) (18 - 18)  SpO2: 94% (25 Mar 2024 13:29) (94% - 98%)    Parameters below as of 25 Mar 2024 13:29  Patient On (Oxygen Delivery Method): room air        03-24 @ 07:01  -  03-25 @ 07:00  --------------------------------------------------------  IN: 1510 mL / OUT: 525 mL / NET: 985 mL          PHYSICAL EXAM:  General: not in distress, well-developed, engaging in conversation  Head: NC/AT  EENT: PERRL, anicteric sclera;   Neck: supple, no appreciable JVD  Respiratory: CTA B/L; no wheezing, rales or rhonchi noted   Cardiovascular: +S1/S2, did not appreciate murmur, distant sounds on all 4 points  Gastrointestinal: soft, NT/ND  Extremities: + bilateral peripheral pulses, onychomycosis with severe keratinization of the nails of all toes, +nonpitting bilateral LE edema, LLE tenderness upon deep palpation   Neurological: awake and alert; GUERRA    LABS:      CBC Full  -  ( 25 Mar 2024 06:39 )  WBC Count : 9.18 K/uL  RBC Count : 3.24 M/uL  Hemoglobin : 9.7 g/dL  Hematocrit : 27.7 %  Platelet Count - Automated : 231 K/uL  Mean Cell Volume : 85.5 fl  Mean Cell Hemoglobin : 29.9 pg  Mean Cell Hemoglobin Concentration : 35.0 gm/dL  Auto Neutrophil # : 6.85 K/uL  Auto Lymphocyte # : 1.60 K/uL  Auto Monocyte # : 0.67 K/uL  Auto Eosinophil # : 0.01 K/uL  Auto Basophil # : 0.00 K/uL  Auto Neutrophil % : 74.7 %  Auto Lymphocyte % : 17.4 %  Auto Monocyte % : 7.3 %  Auto Eosinophil % : 0.1 %  Auto Basophil % : 0.0 %    03-25    148<H>  |  111<H>  |  19  ----------------------------<  102<H>  3.0<L>   |  25  |  0.95    Ca    8.5      25 Mar 2024 06:39  Phos  3.7     03-24  Mg     2.1     03-24    TPro  5.0<L>  /  Alb  3.0<L>  /  TBili  0.7  /  DBili  x   /  AST  65<H>  /  ALT  35  /  AlkPhos  52  03-25    PT/INR - ( 24 Mar 2024 09:54 )   PT: 12.8 sec;   INR: 1.23 ratio         PTT - ( 25 Mar 2024 12:28 )  PTT:104.9 sec      Urinalysis Basic - ( 25 Mar 2024 06:39 )    Color: x / Appearance: x / SG: x / pH: x  Gluc: 102 mg/dL / Ketone: x  / Bili: x / Urobili: x   Blood: x / Protein: x / Nitrite: x   Leuk Esterase: x / RBC: x / WBC x   Sq Epi: x / Non Sq Epi: x / Bacteria: x                RADIOLOGY & ADDITIONAL STUDIES:

## 2024-03-26 DIAGNOSIS — I95.9 HYPOTENSION, UNSPECIFIED: ICD-10-CM

## 2024-03-26 DIAGNOSIS — R65.11 SYSTEMIC INFLAMMATORY RESPONSE SYNDROME (SIRS) OF NON-INFECTIOUS ORIGIN WITH ACUTE ORGAN DYSFUNCTION: ICD-10-CM

## 2024-03-26 LAB
ADD ON TEST-SPECIMEN IN LAB: SIGNIFICANT CHANGE UP
ALBUMIN SERPL ELPH-MCNC: 3.1 G/DL — LOW (ref 3.3–5)
ALP SERPL-CCNC: 58 U/L — SIGNIFICANT CHANGE UP (ref 40–120)
ALT FLD-CCNC: 37 U/L — SIGNIFICANT CHANGE UP (ref 10–45)
ANION GAP SERPL CALC-SCNC: 11 MMOL/L — SIGNIFICANT CHANGE UP (ref 5–17)
APTT BLD: 76.6 SEC — HIGH (ref 24.5–35.6)
AST SERPL-CCNC: 56 U/L — HIGH (ref 10–40)
BILIRUB SERPL-MCNC: 0.6 MG/DL — SIGNIFICANT CHANGE UP (ref 0.2–1.2)
BUN SERPL-MCNC: 17 MG/DL — SIGNIFICANT CHANGE UP (ref 7–23)
CALCIUM SERPL-MCNC: 8 MG/DL — LOW (ref 8.4–10.5)
CHLORIDE SERPL-SCNC: 101 MMOL/L — SIGNIFICANT CHANGE UP (ref 96–108)
CK SERPL-CCNC: 503 U/L — HIGH (ref 25–170)
CO2 SERPL-SCNC: 25 MMOL/L — SIGNIFICANT CHANGE UP (ref 22–31)
CREAT SERPL-MCNC: 0.89 MG/DL — SIGNIFICANT CHANGE UP (ref 0.5–1.3)
EGFR: 67 ML/MIN/1.73M2 — SIGNIFICANT CHANGE UP
GLUCOSE SERPL-MCNC: 372 MG/DL — HIGH (ref 70–99)
HCT VFR BLD CALC: 30.3 % — LOW (ref 34.5–45)
HGB BLD-MCNC: 10.5 G/DL — LOW (ref 11.5–15.5)
MCHC RBC-ENTMCNC: 30.3 PG — SIGNIFICANT CHANGE UP (ref 27–34)
MCHC RBC-ENTMCNC: 34.7 GM/DL — SIGNIFICANT CHANGE UP (ref 32–36)
MCV RBC AUTO: 87.3 FL — SIGNIFICANT CHANGE UP (ref 80–100)
NRBC # BLD: 0 /100 WBCS — SIGNIFICANT CHANGE UP (ref 0–0)
PLATELET # BLD AUTO: 231 K/UL — SIGNIFICANT CHANGE UP (ref 150–400)
POTASSIUM SERPL-MCNC: 3.4 MMOL/L — LOW (ref 3.5–5.3)
POTASSIUM SERPL-SCNC: 3.4 MMOL/L — LOW (ref 3.5–5.3)
PROT SERPL-MCNC: 5.1 G/DL — LOW (ref 6–8.3)
RBC # BLD: 3.47 M/UL — LOW (ref 3.8–5.2)
RBC # FLD: 14.2 % — SIGNIFICANT CHANGE UP (ref 10.3–14.5)
SODIUM SERPL-SCNC: 137 MMOL/L — SIGNIFICANT CHANGE UP (ref 135–145)
WBC # BLD: 7.67 K/UL — SIGNIFICANT CHANGE UP (ref 3.8–10.5)
WBC # FLD AUTO: 7.67 K/UL — SIGNIFICANT CHANGE UP (ref 3.8–10.5)

## 2024-03-26 PROCEDURE — 99233 SBSQ HOSP IP/OBS HIGH 50: CPT | Mod: GC

## 2024-03-26 PROCEDURE — 99233 SBSQ HOSP IP/OBS HIGH 50: CPT

## 2024-03-26 RX ORDER — APIXABAN 2.5 MG/1
10 TABLET, FILM COATED ORAL
Refills: 0 | Status: DISCONTINUED | OUTPATIENT
Start: 2024-03-26 | End: 2024-03-29

## 2024-03-26 RX ORDER — POTASSIUM CHLORIDE 20 MEQ
40 PACKET (EA) ORAL ONCE
Refills: 0 | Status: COMPLETED | OUTPATIENT
Start: 2024-03-26 | End: 2024-03-26

## 2024-03-26 RX ORDER — SODIUM CHLORIDE 9 MG/ML
1000 INJECTION INTRAMUSCULAR; INTRAVENOUS; SUBCUTANEOUS
Refills: 0 | Status: DISCONTINUED | OUTPATIENT
Start: 2024-03-26 | End: 2024-03-27

## 2024-03-26 RX ADMIN — AZITHROMYCIN 255 MILLIGRAM(S): 500 TABLET, FILM COATED ORAL at 08:48

## 2024-03-26 RX ADMIN — Medication 40 MILLIEQUIVALENT(S): at 17:10

## 2024-03-26 RX ADMIN — APIXABAN 10 MILLIGRAM(S): 2.5 TABLET, FILM COATED ORAL at 17:10

## 2024-03-26 RX ADMIN — SODIUM CHLORIDE 75 MILLILITER(S): 9 INJECTION INTRAMUSCULAR; INTRAVENOUS; SUBCUTANEOUS at 15:06

## 2024-03-26 RX ADMIN — CEFTRIAXONE 100 MILLIGRAM(S): 500 INJECTION, POWDER, FOR SOLUTION INTRAMUSCULAR; INTRAVENOUS at 11:37

## 2024-03-26 RX ADMIN — HEPARIN SODIUM 800 UNIT(S)/HR: 5000 INJECTION INTRAVENOUS; SUBCUTANEOUS at 07:17

## 2024-03-26 NOTE — ADVANCED PRACTICE NURSE CONSULT - ASSESSMENT
Patient encountered on 8 [facility]. When wound care RN arrived on unit, patient was found lying in a low air loss pressure redistribution support surface style bed. Ms Martinez was alert and oriented and gave consent to skin consult. She is unable to turn independently and staff assistance x 2 was provided. Once turned, wound care RN was able to visualize an area of persistent nonblanchable dark discoloration with scattered areas of superficial partial thickness skin loss noted on B/L buttocks/sacral skin, area measures approximately 4cm x 6cm x 0.1cm- presentation is consistent with a deep tissue injury in evolution with incontinence involvement present on admission. On upper sacral area there is an area of adherent tan/brown eschar covering 100% of the wound base, site measures approximately 5cm x 5cm x unknown- presentation is consistent with an unstageable pressure injury present on admission. On right buttocks there is maroon discoloration measuring approximately 4cm x 4cm x 0cm- presentation is consistent with a deep tissue injury present on admission. On B/L feet thickened and elongated toenails are noted- podiatry recommended. Once consult was complete, patient was educated regarding the need for routine turning and positioning to prevent pressure injuries and patient was placed in a left side-lying position utilizing pillow positioner assistive devices.   Patient encountered on 8 [facility]. When wound care RN arrived on unit, patient was found lying in a low air loss pressure redistribution support surface style bed. Ms Martinez was alert and oriented and gave consent to skin consult. She is unable to turn independently and staff assistance x 2 was provided. Once turned, fecal and urinary incontinence was apparent and pericare was provided. Once cleaned, the wound care RN was able to visualize an area of persistent nonblanchable dark discoloration with scattered areas of superficial partial thickness skin loss noted on B/L buttocks/sacral skin, area measures approximately 4cm x 6cm x 0.1cm- presentation is consistent with a deep tissue injury in evolution with incontinence involvement present on admission. On upper sacral area there is an area of adherent tan/brown eschar covering 100% of the wound base, site measures approximately 5cm x 5cm x unknown- presentation is consistent with an unstageable pressure injury present on admission. On right buttocks there is maroon discoloration measuring approximately 4cm x 4cm x 0cm- presentation is consistent with a deep tissue injury present on admission. On B/L feet thickened and elongated toenails are noted- podiatry recommended. Once consult was complete, patient was educated regarding the need for routine turning and positioning to prevent pressure injuries and patient was placed in a left side-lying position utilizing pillow positioner assistive devices.

## 2024-03-26 NOTE — DIETITIAN INITIAL EVALUATION ADULT - EDUCATION DIETARY MODIFICATIONS
Emphasized the importance of adequate kcal and protein intake; recommend to optimize nutritional intake in case of decreased appetite; recommended small frequent meals by ordering nutrient-dense snacks and leaving non-perishable food away from tray for later consumption during the day or between meals; to start with protein, and sips of supplement throughout the day; reviewed foods with protein and menu order procedures in hospital./(1) partially meets; needs review/practice/verbalization

## 2024-03-26 NOTE — DIETITIAN INITIAL EVALUATION ADULT - PHYSCIAL ASSESSMENT
Drug Dosing Weight  Height (cm): 170.2 (24 Mar 2024 01:48)  Weight (kg): 63.7 (24 Mar 2024 01:48)  BMI (kg/m2): 22 (24 Mar 2024 01:48)    Daily wt (standing or bed scale): none documented thus far     UBW: pt reports she used to weight 155lb around 2/3 months ago, reports wt loss as she said her pants are loose but unsure recent wt.   Wt history from previous RD notes: no history   Wt history per [facility] HIE: 61.2kg (3/23/24)      ** Noted with wt loss of 14.6lb/9.4% in 2/3 months (significant). RD will continue to monitor wt trends as available/able.     IBW: 135lb, 104% IBW

## 2024-03-26 NOTE — PROGRESS NOTE ADULT - ASSESSMENT
79 y/o F with PMH of Parkinson's disease, HTN, initially brought in by EMS after being found down on the floor by a neighbor. Pt found to be hypothermic, hypotensive, and dehydrated, transferred to MICU for further management of shock, likely hypovolemic vs septic, requiring levophed gtt. Pt taken off of pressors 15 minutes after arrival to MICU. Pt was started on IVF for volume resuscitation and URTH for starvation ketosis. Her mental status improved with IVF. On heparin drip for possible new afib and PE. Pt now downgraded to the floor.

## 2024-03-26 NOTE — DIETITIAN INITIAL EVALUATION ADULT - NSFNSGIASSESSMENTFT_GEN_A_CORE
Denies nausea/vomiting/constipation/diarrhea. Last BM on 3/26 per flowsheet, not on any bowel regimen.

## 2024-03-26 NOTE — ADVANCED PRACTICE NURSE CONSULT - RECOMMEDATIONS
Impression:     fecal incontinence  urinary incontinence  B/L buttocks/sacral deep tissue injury in evolution  upper sacrum unstageable pressure injury present on admission   right buttocks deep tissue injury present on admission   thickened elongated toenails- podiatry following     Recommendations:     1) continue turning and positioning q2 and PRN utilizing offloading assistive devices  2) continue with routine pericare daily and PRN soiling  3) encourage optimal nutrition  4) waffle cushion when oob to chair  5) B/L LE complete cair air fluidized boots to offload heels/feet  6) upper sacrum- cleanse skin and pat dry then apply cavilon to periwound, apply medihoney to wound base and cover with allevyn foam dressing and change daily and/or PRN soiling  7) incontinence management - consider external urinary catheter to divert urine from skin if incontinent  8) triad protective barrier cream to perianal area/ischium daily and PRN soiling  9) consider podiatry for treatment recommendations regarding B/L feet    Plan discussed with PHILOMENA Cool on unit    For any questions/comments regarding the recommendations in this consult, please contact Iris at [phone] for clarification. Thank you!

## 2024-03-26 NOTE — DIETITIAN INITIAL EVALUATION ADULT - PERTINENT MEDS FT
MEDICATIONS  (STANDING):  azithromycin  IVPB      azithromycin  IVPB 500 milliGRAM(s) IV Intermittent every 24 hours  cefTRIAXone   IVPB 1000 milliGRAM(s) IV Intermittent every 24 hours  dextrose 5% + lactated ringers. 1000 milliLiter(s) (100 mL/Hr) IV Continuous <Continuous>  heparin  Infusion. 1500 Unit(s)/Hr (15 mL/Hr) IV Continuous <Continuous>  influenza  Vaccine (HIGH DOSE) 0.7 milliLiter(s) IntraMuscular once    MEDICATIONS  (PRN):

## 2024-03-26 NOTE — DIETITIAN INITIAL EVALUATION ADULT - SIGNS/SYMPTOMS
pt with pressure injury stage >/2 upon admission  pt with significant wt loss in 2/3 months, severe fat and muscle wasting

## 2024-03-26 NOTE — DIETITIAN INITIAL EVALUATION ADULT - ENERGY INTAKE
Pt states she start to eat well today >75% of foods provided. Pt states friend is bringing her denture for her, requesting for regular texture foods once she get her denture. Pt made aware of menu ordering procedure in house with menu provided, encourage pt to order preferred foods to optimize PO intake while in house. Pt is amendable to receive oral nutrition supplements to supplement PO intake in house.

## 2024-03-26 NOTE — DIETITIAN INITIAL EVALUATION ADULT - NS FNS DIET ORDER
Diet, DASH/TLC:   Sodium & Cholesterol Restricted  Soft and Bite Sized (SOFTBTSZ) (03-25-24 @ 17:54)

## 2024-03-26 NOTE — DIETITIAN INITIAL EVALUATION ADULT - NSFNSPHYEXAMSKINFT_GEN_A_CORE
per flowsheet 3/24: right buttock suspected deep tissue injury, sacrum unstageable pressure injury  per wound care note 3/26: "B/L buttocks/sacral deep tissue injury in evolution  upper sacrum unstageable pressure injury present on admission   right buttocks deep tissue injury present on admission "

## 2024-03-26 NOTE — DIETITIAN INITIAL EVALUATION ADULT - ADD RECOMMEND
-- Recommend MVI and Vitamin C, pending no medical contraindications, for micronutrient support/aid wound healing.   -- Monitor PO intake, GI tolerance, skin integrity, labs, weight, and bowel movement regularity.   -- Will continue to honor food and beverage preferences within diet restriction of patient in an effort to maximize level of nutrient intake.  -- Assist with meals PRN and encourage PO intake.  -- Malnutrition alert placed in chart.

## 2024-03-26 NOTE — DIETITIAN INITIAL EVALUATION ADULT - ORAL INTAKE PTA/DIET HISTORY
Pt reports decreased appetite/PO intake for the past 2/3 months due to not able to prepare her meals due to medical condition. Also reports decreased PO intake to usually <75% of her usual PO intake for the past 2/3 months. Pt states she usually wear denture, Denies difficulty chewing/swallowing with denture. Denies nausea/vomiting/constipation/diarrhea.  Confirms NKFA, reports lactose intolerance, unable to tolerate milk, ice cream and cheese, ok for pudding and yogurt.   Micronutrient/Other supplementation: none  Protein-energy supplementation: none

## 2024-03-26 NOTE — PHYSICAL THERAPY INITIAL EVALUATION ADULT - ADDITIONAL COMMENTS
Pt lives in a house, alone, 3+3 steps to enter (+) B handrail, 1st fl set-up. Pt reports amb w/ shopping cart for stability w/in community and furniture surfing w/in home. Pt reports incr difficulty w/ standing balance over past few month. Pt denies prior history of falls in past 12 months.

## 2024-03-26 NOTE — PROGRESS NOTE ADULT - SUBJECTIVE AND OBJECTIVE BOX
[facility] Division of Hospital Medicine  Penelope Helm MD  Available via MS Teams    SUBJECTIVE / OVERNIGHT EVENTS:  no acute events overnight   pt states she is sleeping slightly better   denies any acute complaints, including CP, SOB, abd pain, n/v, states she is eating well, having small BMs     ADDITIONAL REVIEW OF SYSTEMS:  14 point ROS negative except as noted above     MEDICATIONS  (STANDING):  azithromycin  IVPB      azithromycin  IVPB 500 milliGRAM(s) IV Intermittent every 24 hours  cefTRIAXone   IVPB 1000 milliGRAM(s) IV Intermittent every 24 hours  heparin  Infusion. 1500 Unit(s)/Hr (15 mL/Hr) IV Continuous <Continuous>  influenza  Vaccine (HIGH DOSE) 0.7 milliLiter(s) IntraMuscular once  potassium chloride    Tablet ER 40 milliEquivalent(s) Oral once  sodium chloride 0.9%. 1000 milliLiter(s) (75 mL/Hr) IV Continuous <Continuous>    MEDICATIONS  (PRN):      I&O's Summary    25 Mar 2024 07:01  -  26 Mar 2024 07:00  --------------------------------------------------------  IN: 240 mL / OUT: 200 mL / NET: 40 mL        PHYSICAL EXAM:  Vital Signs Last 24 Hrs  T(C): 36.5 (26 Mar 2024 09:45), Max: 37.2 (25 Mar 2024 13:29)  T(F): 97.7 (26 Mar 2024 09:45), Max: 99 (26 Mar 2024 00:00)  HR: 88 (26 Mar 2024 09:45) (66 - 93)  BP: 94/60 (26 Mar 2024 09:45) (92/55 - 110/66)  BP(mean): --  RR: 18 (26 Mar 2024 09:45) (18 - 18)  SpO2: 98% (26 Mar 2024 09:45) (94% - 100%)    Parameters below as of 26 Mar 2024 09:45  Patient On (Oxygen Delivery Method): room air      PHYSICAL EXAM:  GENERAL: NAD, frail, chronically ill appearing   HEAD:  Atraumatic, normocephalic  EYES: EOMI, conjunctiva and sclera clear  NECK: Supple, no JVD  CHEST/LUNG: Clear to auscultation bilaterally; no wheezing or rales  HEART: Regular rate and rhythm; no murmurs, no LE edema   ABDOMEN: Soft, nontender, nondistended; bowel sounds present  EXTREMITIES:  2+ Peripheral Pulses, no clubbing, cyanosis; +onychomycosis   PSYCH: AAOx3, calm affect, not anxious  SKIN: No rashes or lesions      LABS:                        10.5   7.67  )-----------( 231      ( 26 Mar 2024 09:50 )             30.3     03-26    137  |  101  |  17  ----------------------------<  372<H>  3.4<L>   |  25  |  0.89    Ca    8.0<L>      26 Mar 2024 09:49    TPro  5.1<L>  /  Alb  3.1<L>  /  TBili  0.6  /  DBili  x   /  AST  56<H>  /  ALT  37  /  AlkPhos  58  03-26    PTT - ( 26 Mar 2024 04:00 )  PTT:76.6 sec      Urinalysis Basic - ( 26 Mar 2024 09:49 )    Color: x / Appearance: x / SG: x / pH: x  Gluc: 372 mg/dL / Ketone: x  / Bili: x / Urobili: x   Blood: x / Protein: x / Nitrite: x   Leuk Esterase: x / RBC: x / WBC x   Sq Epi: x / Non Sq Epi: x / Bacteria: x            RADIOLOGY & ADDITIONAL TESTS:  New Imaging Personally Reviewed Today:  New Electrocardiogram Personally Reviewed Today:  Other Results Reviewed Today:   Prior or Outpatient Records Reviewed Today with Summary:    COORDINATION OF CARE:  Consultant Communication and Details of Discussion (where applicable):

## 2024-03-26 NOTE — DIETITIAN NUTRITION RISK NOTIFICATION - TREATMENT: THE FOLLOWING DIET HAS BEEN RECOMMENDED
Called [facility] and s/w Aixa in order to obtain or see if patient has had his MRI of his shoulder. Aixa states that she will check to see what she can find.    Diet, DASH/TLC:   Sodium & Cholesterol Restricted  Soft and Bite Sized (SOFTBTSZ)  Supplement Feeding Modality:  Oral  Ensure Enlive Cans or Servings Per Day:  3       Frequency:  Daily (03-26-24 @ 13:08) [Active]

## 2024-03-26 NOTE — PROGRESS NOTE ADULT - SUBJECTIVE AND OBJECTIVE BOX
PULMONARY CONSULT SERVICE FOLLOW-UP NOTE    INTERVAL HPI:  Reviewed chart and overnight events; patient seen and examined at bedside. Pt denied acute symptoms such as dyspnea, chest pain, palpitation, abdominal pain, N/V/D however still has pain in the LLE unchanged from prior exam.   She added that for the past couple of weeks, has been having minimal amount of sleep at night which has improved since in the hospital.  Pt concern for repeated blood draws, writer explained that the daily blood draws is necessary to check for the PTT since pt on heparin, to ensure the level within therapeutic window.     MEDICATIONS:  Pulmonary:    Antimicrobials:  azithromycin  IVPB      azithromycin  IVPB 500 milliGRAM(s) IV Intermittent every 24 hours  cefTRIAXone   IVPB 1000 milliGRAM(s) IV Intermittent every 24 hours    Anticoagulants:  heparin  Infusion. 1500 Unit(s)/Hr IV Continuous <Continuous>    Cardiac:      Allergies    No Known Allergies    Intolerances        Vital Signs Last 24 Hrs  T(C): 36.8 (26 Mar 2024 04:00), Max: 37.2 (25 Mar 2024 13:29)  T(F): 98.2 (26 Mar 2024 04:00), Max: 99 (26 Mar 2024 00:00)  HR: 66 (26 Mar 2024 04:00) (66 - 93)  BP: 92/55 (26 Mar 2024 04:00) (92/55 - 110/66)  BP(mean): --  RR: 18 (26 Mar 2024 04:00) (18 - 18)  SpO2: 98% (26 Mar 2024 04:00) (94% - 100%)    Parameters below as of 26 Mar 2024 04:00  Patient On (Oxygen Delivery Method): room air        03-25 @ 07:01  -  03-26 @ 07:00  --------------------------------------------------------  IN: 240 mL / OUT: 200 mL / NET: 40 mL          PHYSICAL EXAM:  General: not in distress, well-developed, engaging in conversation  Head: NC/AT  EENT: PERRL, anicteric sclera;   Neck: supple, no appreciable JVD  Respiratory: CTA B/L; no wheezing, rales or rhonchi noted   Cardiovascular: +S1/S2, did not appreciate murmur, distant sounds on all 4 points  Gastrointestinal: soft, NT/ND  Extremities: + bilateral peripheral pulses, onychomycosis with severe keratinization of the nails of all toes, +nonpitting bilateral LE edema, LLE tenderness upon deep palpation   Neurological: awake and alert; GUERRA    LABS:      CBC Full  -  ( 25 Mar 2024 06:39 )  WBC Count : 9.18 K/uL  RBC Count : 3.24 M/uL  Hemoglobin : 9.7 g/dL  Hematocrit : 27.7 %  Platelet Count - Automated : 231 K/uL  Mean Cell Volume : 85.5 fl  Mean Cell Hemoglobin : 29.9 pg  Mean Cell Hemoglobin Concentration : 35.0 gm/dL  Auto Neutrophil # : 6.85 K/uL  Auto Lymphocyte # : 1.60 K/uL  Auto Monocyte # : 0.67 K/uL  Auto Eosinophil # : 0.01 K/uL  Auto Basophil # : 0.00 K/uL  Auto Neutrophil % : 74.7 %  Auto Lymphocyte % : 17.4 %  Auto Monocyte % : 7.3 %  Auto Eosinophil % : 0.1 %  Auto Basophil % : 0.0 %    03-25    148<H>  |  111<H>  |  19  ----------------------------<  102<H>  3.0<L>   |  25  |  0.95    Ca    8.5      25 Mar 2024 06:39  Phos  3.7     03-24  Mg     2.1     03-24    TPro  5.0<L>  /  Alb  3.0<L>  /  TBili  0.7  /  DBili  x   /  AST  65<H>  /  ALT  35  /  AlkPhos  52  03-25    PT/INR - ( 24 Mar 2024 09:54 )   PT: 12.8 sec;   INR: 1.23 ratio         PTT - ( 26 Mar 2024 04:00 )  PTT:76.6 sec      Urinalysis Basic - ( 25 Mar 2024 06:39 )    Color: x / Appearance: x / SG: x / pH: x  Gluc: 102 mg/dL / Ketone: x  / Bili: x / Urobili: x   Blood: x / Protein: x / Nitrite: x   Leuk Esterase: x / RBC: x / WBC x   Sq Epi: x / Non Sq Epi: x / Bacteria: x                RADIOLOGY & ADDITIONAL STUDIES:

## 2024-03-26 NOTE — DIETITIAN INITIAL EVALUATION ADULT - CONTINUE CURRENT NUTRITION CARE PLAN
Continue DASH soft/bite size diet as tolerated, may Recommend to d/c soft/bite size diet once pt get her denture. RD remains available to adjust diet as needed./yes

## 2024-03-26 NOTE — DIETITIAN INITIAL EVALUATION ADULT - OTHER CALCULATIONS
Fluid needs deferred to team. Energy and protein needs based on dosing wt of 63.7kg in consideration of malnutrition and Increased nutrient needs.

## 2024-03-26 NOTE — PHYSICAL THERAPY INITIAL EVALUATION ADULT - PERTINENT HX OF CURRENT PROBLEM, REHAB EVAL
Pt is a 79 y/o F with PMH of Parkinson's disease, HTN, initially brought in by EMS after being found down on the floor by a neighbor. Pt found to be hypothermic, hypotensive, and dehydrated, transferred to MICU for further management of shock, likely hypovolemic vs septic, requiring levophed gtt. Pt taken off of pressors 15 minutes after arrival to MICU. Pt was started on IVF for volume resuscitation and RUTH for starvation ketosis. Her mental status improved with IVF. On heparin drip for possible new afib and PE. Pt now downgraded to the floor.     Imaging:   3/25 Duplex BLE: No evidence of deep venous thrombosis in the right lower extremity. Chronic postthrombotic changes within the left lower extremity   3/24 CT LLE: No acute fracture.  CT BRAIN: No acute intracranial bleeding. Central volume loss and chronic microvascular ischemic changes.  CT CERVICAL SPINE: Age-indeterminate superior T1, T2, and T3 endplate concavity is likely chronic and degenerative. Correlate with site of pain. Right-sided intravenous air including the right subclavian and right  internal jugular veins extending into the deep tissues of the neck.   Correlate for instrumentation and iatrogenic causes.

## 2024-03-26 NOTE — PROGRESS NOTE ADULT - ASSESSMENT
Assessment:   79 y/o F with PMH of Parkinson's disease, HTN p/w hypothermia, hypotension s/p ?fall found on the floor by neighbor. Pt initially admitted to MICU for hypovolemic vs septic shock, temporary Levophed gtt. She was downgraded to medical floor s/p IVF resuscitation. Noted to have Afib and found to have PE. Pulmonology consulted to PE.     CT scan of the chest showed small segmental PE, Trop peaked to 153 and then downtrended. Pro-BNP 3354. Echo showed no signs of RV failure, slight increased LV wall thickness, with EF ~65% w/o regional wall abnormalities, normal RV, Pul systolic pressure 25 mmHg, TAPSE 2.9 cm, mildly dilated LA, mild TR. LE doppler US showed no evidence of deep venous thrombosis in the right lower extremity. 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 Currently on heparin drip.   Based on the pt's history, imaging, likely provoked PE in the setting of immobilization however can not rule out underlying malignancy or other etiologies as underlying cause of PE. PESI score: 77 points, class II [low risk :1.7-3.5% 30 day mortality].     PLAN:   - recommend transitioning to Eliquis 10 mg BID for 7 days followed by 5mg BID thereafter if no procedure planned   - Rapid HIV test  - oupt sleep study to rule out sleep apnea  - follow up with heme/onc outpt for hypercoagulable studies   - age appropriate cancer screening   - electrolyte repletion    Assessment:   79 y/o F with PMH of Parkinson's disease, HTN p/w hypothermia, hypotension s/p ?fall found on the floor by neighbor. Pt initially admitted to MICU for hypovolemic vs septic shock, temporary Levophed gtt. She was downgraded to medical floor s/p IVF resuscitation. Noted to have Afib and found to have PE. Pulmonology consulted to PE.     CT scan of the chest showed small segmental PE, Trop peaked to 153 and then downtrended. Pro-BNP 3354. Echo showed no signs of RV failure, slight increased LV wall thickness, with EF ~65% w/o regional wall abnormalities, normal RV, Pul systolic pressure 25 mmHg, TAPSE 2.9 cm, mildly dilated LA, mild TR. LE doppler US showed no evidence of deep venous thrombosis in the right lower extremity. 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 Currently on heparin drip.   Based on the pt's history, imaging, likely provoked PE in the setting of immobilization however can not rule out underlying malignancy or other etiologies as underlying cause of PE. PESI score: 77 points, class II [low risk :1.7-3.5% 30 day mortality].     PLAN:   - recommend transitioning to Eliquis 10 mg BID for 7 days followed by 5mg BID thereafter   - Rapid HIV test  - oupt sleep study to rule out sleep apnea  - follow up with heme/onc outpt for hypercoagulable studies   - age appropriate cancer screening   - electrolyte repletion

## 2024-03-26 NOTE — DIETITIAN INITIAL EVALUATION ADULT - OTHER INFO
-- on IVF D5% + Lactated Ringers @ 100ml/hr   -- s/p Levophed from 3/23 to 3/24  -- s/p KCl for hypokalemia, noted with hypokalemia today, will continue to monitor labs.  -- Most recent HbA1c 5.2 on 3/24, no history of DM per pt.

## 2024-03-26 NOTE — PROGRESS NOTE ADULT - SUBJECTIVE AND OBJECTIVE BOX
Patient is a 78y old  Female who presents with a chief complaint of hypotension (26 Mar 2024 08:16)       INTERVAL HPI/OVERNIGHT EVENTS:  Patient seen and evaluated at bedside.  Pt is resting comfortable in NAD. Denies N/V/F/C.    Allergies    No Known Allergies    Intolerances        Vital Signs Last 24 Hrs  T(C): 36.8 (26 Mar 2024 04:00), Max: 37.2 (25 Mar 2024 13:29)  T(F): 98.2 (26 Mar 2024 04:00), Max: 99 (26 Mar 2024 00:00)  HR: 66 (26 Mar 2024 04:00) (66 - 93)  BP: 92/55 (26 Mar 2024 04:00) (92/55 - 110/66)  BP(mean): --  RR: 18 (26 Mar 2024 04:00) (18 - 18)  SpO2: 98% (26 Mar 2024 04:00) (94% - 100%)    Parameters below as of 26 Mar 2024 04:00  Patient On (Oxygen Delivery Method): room air        LABS:                        9.7    9.18  )-----------( 231      ( 25 Mar 2024 06:39 )             27.7     03-25    148<H>  |  111<H>  |  19  ----------------------------<  102<H>  3.0<L>   |  25  |  0.95    Ca    8.5      25 Mar 2024 06:39  Phos  3.7     03-24  Mg     2.1     03-24    TPro  5.0<L>  /  Alb  3.0<L>  /  TBili  0.7  /  DBili  x   /  AST  65<H>  /  ALT  35  /  AlkPhos  52  03-25    PT/INR - ( 24 Mar 2024 09:54 )   PT: 12.8 sec;   INR: 1.23 ratio         PTT - ( 26 Mar 2024 04:00 )  PTT:76.6 sec  Urinalysis Basic - ( 25 Mar 2024 06:39 )    Color: x / Appearance: x / SG: x / pH: x  Gluc: 102 mg/dL / Ketone: x  / Bili: x / Urobili: x   Blood: x / Protein: x / Nitrite: x   Leuk Esterase: x / RBC: x / WBC x   Sq Epi: x / Non Sq Epi: x / Bacteria: x      CAPILLARY BLOOD GLUCOSE          Lower Extremity Physical Exam:  Vascular: DP/PT 1/4, B/L, CFT <3 seconds B/L, Temperature gradient warm to cool, B/L.   Neuro: Epicritic sensation intact to the level of digits, B/L.  Musculoskeletal/Ortho: no pain on palpation, no tenderness of b/l feet, pt unable to ambulate or ROM ankle joint, able to wiggle toes.   Skin: b/l elongated painful fungal toe nails, no open wounds or signs of infection. No ecchymosis, no edema.     RADIOLOGY & ADDITIONAL TESTS:  < from: Xray Foot AP + Lateral + Oblique, Bilat (03.23.24 @ 13:32) >    ACC: [id number] EXAM:  XR FOOT COMP MIN 3 VIEWS BI   ORDERED BY:  SHERRIE CHÁVEZ     PROCEDURE DATE:  03/23/2024          INTERPRETATION:  CLINICAL INDICATION: Redness noted on foot, status post   fall.  TECHNIQUE: Bilateral foot radiographs, 3 respectively views.    COMPARISON: None available.    FINDINGS:    Left foot:  Questionable minimally displaced fracture involving the inferior aspect   of the anterior calcaneal articular surface of the talus. Due to   patient's foot positioning, this may be an artifact of overlying joint   spaces. No dislocation. Joint spaces are maintained.    Right foot:  Chronic appearing focus of ossification inferior posterior to the cuboid,   which may represent calcific tendinosis versus old avulsion fracture. No   dislocation. Joint spaces are maintained.      IMPRESSION:  Acute minimally displaced fracture involving the left inferior aspect of   the anterior calcaneal articular surface of the talus although this   finding may be artifactual due to tilting of the foot.    Consider repeat lateral view of the left foot or CT examination of the   left ankle for further evaluation.    --- End of Report ---          KENNETH TRAMMELL MD; Resident Radiologist  This document has been electronically signed.  NAM CHOPRA MD; Attending Interventional Radiologist  This document has been electronically signed. Mar 23 2024  3:16PM    < end of copied text >  < from: CT Lower Extremity No Cont, Left (03.24.24 @ 14:10) >    ACC: [id number] EXAM:  CT 3D RECONSTRUCT W [facility]   ORDERED BY: VIKY CARLISLE     ACC: [id number] EXAM:  CT LWR EXT LT   ORDERED BY: VIKY VAYSBLAT     PROCEDURE DATE:  03/24/2024          INTERPRETATION:  CT of the left lower extremity    CLINICAL INFORMATION: Rule out talus fracture  TECHNIQUE: Axial CT images were obtained of the left ankle with coronal   and sagittal reconstructions. No contrast was administered. Three   dimensional reconstructions were performed.    COMPARISON: Foot radiographs 3/23/2024.    FINDINGS:    No acute fracture or dislocation. There is mild navicular cuneiform   arthrosis. Chronic ossific focus is present adjacent to the anterior tip   of the fibula. There is calcaneal enthesopathy. There is diffuse moderate   atrophy. Mild subcutaneous edema about the foot.    IMPRESSION:    No acute fracture.    --- End of Report ---            DANIEL KAUFFMAN MD; Attending Radiologist  This document has been electronically signed. Mar 24 2024  2:21PM    < end of copied text >

## 2024-03-26 NOTE — DIETITIAN INITIAL EVALUATION ADULT - ETIOLOGY
inadequate PO intake in setting of inability to prepare meals and decreased appetite increased physiological demand for nutrients

## 2024-03-26 NOTE — ADVANCED PRACTICE NURSE CONSULT - REASON FOR CONSULT
Wound care consult initiated by RN to assess patient's skin for a possible unstageable pressure injury on sacrum present on admission     Reason for Admission: hypotension  History of Present Illness:   77 y/o F with PMH of Parkinson's disease, HTN, initially brought in by EMS after being found down on the floor by a neighbor. Per EMS, she fell on Tuesday and was down on the floor since then, was using bottle to urinate and defecate into while eating tinned sardines. Pt reports she has been taking medications but unsure which ones, per EMS there was an old bottle of losartan at her home.     Pt AAOx4, reports that she remembers initial fall that occurred on Tuesday. She was reorganizing some of her things and slipped, fell, and a box fell on her. She felt like she could not get up after because she could not balance herself. Denies head strike LOC, but does not remember EMS entering her apartment and bringing her out on a stretcher. Denies hx of T2DM, heart disease but does report an issue with her heart valve that she was evaluated for outpatient but reports that it was "fine" and that she did not need surgery. Pt reports she only takes valsartan and furosemide at home, does not remember dosages.    In ED, pt received 2L NS bolus and 1L LR bolus, vanc, zosyn, solumedrol 125 mg IV, heparin gtt, later started on levo gtt and given albumin 5% 250cc  CT Head unremarkable, CT C-spine shows age-indeterminate T1, T2, T3 endplate concavity, and R sided intravenous air including R subclavian and IJ veins extending into deep tissues of neck.      CT Chest/A/P: age-indeterminate mild L2 and moderate L3-L4 compression deformities, no fx. Subsegmental pulmonary arterial filling defect suggesting very small peripheral PE. Dilated pulmonary trunk compatible with chronic pulmonary arterial hypertension. No R heart strain.

## 2024-03-26 NOTE — DIETITIAN INITIAL EVALUATION ADULT - PERTINENT LABORATORY DATA
03-26    137  |  101  |  17  ----------------------------<  372<H>  3.4<L>   |  25  |  0.89    Ca    8.0<L>      26 Mar 2024 09:49    TPro  5.1<L>  /  Alb  3.1<L>  /  TBili  0.6  /  DBili  x   /  AST  56<H>  /  ALT  37  /  AlkPhos  58  03-26  A1C with Estimated Average Glucose Result: 5.2 % (03-24-24 @ 02:59)  A1C with Estimated Average Glucose Result: 5.2 % (03-23-24 @ 10:48)

## 2024-03-26 NOTE — PROGRESS NOTE ADULT - ASSESSMENT
78F with possible L talus fx   - Pt seen and evaluated   - Afebrile, no leukocytosis   - Neurovascular status intact, no pain on palpation, no tenderness of b/l feet, pt unable to ambulate or ROM ankle joint, able to wiggle toes. b/l elongated painful fungal toe nails, no open wounds or signs of infection. No ecchymosis, no edema.   - Left foot xray: Acute minimally displaced fracture involving the left inferior aspect of the anterior calcaneal articular surface of the talus although this finding may be artifactual due to tilting of the foot.  - LLE CT: no acute fracture   - rec zflo at all times while pt in bed   - Stable for discharge from podiatry standpoint  - Follow up information listed in d/c note provider   - Seen with attending

## 2024-03-27 LAB
ANION GAP SERPL CALC-SCNC: 9 MMOL/L — SIGNIFICANT CHANGE UP (ref 5–17)
APTT BLD: 27.5 SEC — SIGNIFICANT CHANGE UP (ref 24.5–35.6)
BUN SERPL-MCNC: 15 MG/DL — SIGNIFICANT CHANGE UP (ref 7–23)
CALCIUM SERPL-MCNC: 8 MG/DL — LOW (ref 8.4–10.5)
CHLORIDE SERPL-SCNC: 109 MMOL/L — HIGH (ref 96–108)
CO2 SERPL-SCNC: 25 MMOL/L — SIGNIFICANT CHANGE UP (ref 22–31)
CREAT SERPL-MCNC: 0.88 MG/DL — SIGNIFICANT CHANGE UP (ref 0.5–1.3)
EGFR: 68 ML/MIN/1.73M2 — SIGNIFICANT CHANGE UP
GLUCOSE SERPL-MCNC: 81 MG/DL — SIGNIFICANT CHANGE UP (ref 70–99)
POTASSIUM SERPL-MCNC: 4.2 MMOL/L — SIGNIFICANT CHANGE UP (ref 3.5–5.3)
POTASSIUM SERPL-SCNC: 4.2 MMOL/L — SIGNIFICANT CHANGE UP (ref 3.5–5.3)
SODIUM SERPL-SCNC: 143 MMOL/L — SIGNIFICANT CHANGE UP (ref 135–145)

## 2024-03-27 PROCEDURE — 99233 SBSQ HOSP IP/OBS HIGH 50: CPT | Mod: GC

## 2024-03-27 PROCEDURE — 99232 SBSQ HOSP IP/OBS MODERATE 35: CPT

## 2024-03-27 RX ORDER — ACETAMINOPHEN 500 MG
650 TABLET ORAL EVERY 6 HOURS
Refills: 0 | Status: DISCONTINUED | OUTPATIENT
Start: 2024-03-27 | End: 2024-03-29

## 2024-03-27 RX ORDER — LANOLIN ALCOHOL/MO/W.PET/CERES
3 CREAM (GRAM) TOPICAL AT BEDTIME
Refills: 0 | Status: DISCONTINUED | OUTPATIENT
Start: 2024-03-27 | End: 2024-03-29

## 2024-03-27 RX ORDER — SODIUM CHLORIDE 9 MG/ML
1000 INJECTION INTRAMUSCULAR; INTRAVENOUS; SUBCUTANEOUS
Refills: 0 | Status: DISCONTINUED | OUTPATIENT
Start: 2024-03-27 | End: 2024-03-27

## 2024-03-27 RX ORDER — ONDANSETRON 8 MG/1
4 TABLET, FILM COATED ORAL EVERY 8 HOURS
Refills: 0 | Status: DISCONTINUED | OUTPATIENT
Start: 2024-03-27 | End: 2024-03-29

## 2024-03-27 RX ADMIN — APIXABAN 10 MILLIGRAM(S): 2.5 TABLET, FILM COATED ORAL at 06:14

## 2024-03-27 RX ADMIN — APIXABAN 10 MILLIGRAM(S): 2.5 TABLET, FILM COATED ORAL at 17:19

## 2024-03-27 RX ADMIN — SODIUM CHLORIDE 100 MILLILITER(S): 9 INJECTION INTRAMUSCULAR; INTRAVENOUS; SUBCUTANEOUS at 09:33

## 2024-03-27 NOTE — PROGRESS NOTE ADULT - SUBJECTIVE AND OBJECTIVE BOX
PULMONARY CONSULT SERVICE FOLLOW-UP NOTE    INTERVAL HPI:   No acute overnight events. patient seen and examined at bedside. Pt denied acute symptoms including dyspnea, chest pain, palpitation.     MEDICATIONS:  Pulmonary:    Antimicrobials:    Anticoagulants:  apixaban 10 milliGRAM(s) Oral two times a day    Cardiac:      Allergies    No Known Allergies    Intolerances        Vital Signs Last 24 Hrs  T(C): 37.2 (27 Mar 2024 04:32), Max: 37.2 (26 Mar 2024 15:49)  T(F): 99 (27 Mar 2024 04:32), Max: 99 (26 Mar 2024 15:49)  HR: 65 (27 Mar 2024 04:32) (65 - 88)  BP: 98/48 (27 Mar 2024 04:32) (86/47 - 98/48)  BP(mean): --  RR: 18 (27 Mar 2024 04:32) (18 - 18)  SpO2: 98% (27 Mar 2024 04:32) (96% - 98%)    Parameters below as of 27 Mar 2024 04:32  Patient On (Oxygen Delivery Method): room air        03-26 @ 07:01  -  03-27 @ 07:00  --------------------------------------------------------  IN: 0 mL / OUT: 800 mL / NET: -800 mL          PHYSICAL EXAM:  Constitutional: well-appearing  HEENT: NC/AT; PERRL, anicteric sclera; MMM  Neck: supple  Cardiovascular: +S1/S2, RRR  Respiratory: CTA B/L; no W/R/R  Gastrointestinal: soft, NT/ND  Extremities: WWP; no edema, clubbing or cyanosis  Vascular: 2+ radial pulses B/L  Neurological: awake and alert; GUERRA    LABS:      CBC Full  -  ( 26 Mar 2024 09:50 )  WBC Count : 7.67 K/uL  RBC Count : 3.47 M/uL  Hemoglobin : 10.5 g/dL  Hematocrit : 30.3 %  Platelet Count - Automated : 231 K/uL  Mean Cell Volume : 87.3 fl  Mean Cell Hemoglobin : 30.3 pg  Mean Cell Hemoglobin Concentration : 34.7 gm/dL  Auto Neutrophil # : x  Auto Lymphocyte # : x  Auto Monocyte # : x  Auto Eosinophil # : x  Auto Basophil # : x  Auto Neutrophil % : x  Auto Lymphocyte % : x  Auto Monocyte % : x  Auto Eosinophil % : x  Auto Basophil % : x    03-27    143  |  109<H>  |  15  ----------------------------<  81  4.2   |  25  |  0.88    Ca    8.0<L>      27 Mar 2024 07:23    TPro  5.1<L>  /  Alb  3.1<L>  /  TBili  0.6  /  DBili  x   /  AST  56<H>  /  ALT  37  /  AlkPhos  58  03-26    PTT - ( 27 Mar 2024 07:22 )  PTT:27.5 sec      Urinalysis Basic - ( 27 Mar 2024 07:23 )    Color: x / Appearance: x / SG: x / pH: x  Gluc: 81 mg/dL / Ketone: x  / Bili: x / Urobili: x   Blood: x / Protein: x / Nitrite: x   Leuk Esterase: x / RBC: x / WBC x   Sq Epi: x / Non Sq Epi: x / Bacteria: x                RADIOLOGY & ADDITIONAL STUDIES: PULMONARY CONSULT SERVICE FOLLOW-UP NOTE    INTERVAL HPI:   No acute overnight events. Pt seen and examined at bedside. Pt denied acute symptoms including dyspnea, chest pain, palpitation.     MEDICATIONS:  Pulmonary:    Antimicrobials:    Anticoagulants:  apixaban 10 milliGRAM(s) Oral two times a day    Cardiac:      Allergies    No Known Allergies    Intolerances        Vital Signs Last 24 Hrs  T(C): 37.2 (27 Mar 2024 04:32), Max: 37.2 (26 Mar 2024 15:49)  T(F): 99 (27 Mar 2024 04:32), Max: 99 (26 Mar 2024 15:49)  HR: 65 (27 Mar 2024 04:32) (65 - 88)  BP: 98/48 (27 Mar 2024 04:32) (86/47 - 98/48)  BP(mean): --  RR: 18 (27 Mar 2024 04:32) (18 - 18)  SpO2: 98% (27 Mar 2024 04:32) (96% - 98%)    Parameters below as of 27 Mar 2024 04:32  Patient On (Oxygen Delivery Method): room air        03-26 @ 07:01  -  03-27 @ 07:00  --------------------------------------------------------  IN: 0 mL / OUT: 800 mL / NET: -800 mL          PHYSICAL EXAM:  General: not in distress, well-developed, engaging in conversation  Head: NC/AT  EENT: PERRL, anicteric sclera;   Neck: supple, no appreciable JVD  Respiratory: CTA B/L; no wheezing, rales or rhonchi noted   Cardiovascular: +S1/S2, did not appreciate murmur, distant sounds on all 4 points  Gastrointestinal: soft, NT/ND  Extremities: + bilateral peripheral pulses, onychomycosis with severe keratinization of the nails of all toes, +nonpitting bilateral LE edema, LLE tenderness upon deep palpation   Neurological: awake and alert;    LABS:      CBC Full  -  ( 26 Mar 2024 09:50 )  WBC Count : 7.67 K/uL  RBC Count : 3.47 M/uL  Hemoglobin : 10.5 g/dL  Hematocrit : 30.3 %  Platelet Count - Automated : 231 K/uL  Mean Cell Volume : 87.3 fl  Mean Cell Hemoglobin : 30.3 pg  Mean Cell Hemoglobin Concentration : 34.7 gm/dL  Auto Neutrophil # : x  Auto Lymphocyte # : x  Auto Monocyte # : x  Auto Eosinophil # : x  Auto Basophil # : x  Auto Neutrophil % : x  Auto Lymphocyte % : x  Auto Monocyte % : x  Auto Eosinophil % : x  Auto Basophil % : x    03-27    143  |  109<H>  |  15  ----------------------------<  81  4.2   |  25  |  0.88    Ca    8.0<L>      27 Mar 2024 07:23    TPro  5.1<L>  /  Alb  3.1<L>  /  TBili  0.6  /  DBili  x   /  AST  56<H>  /  ALT  37  /  AlkPhos  58  03-26    PTT - ( 27 Mar 2024 07:22 )  PTT:27.5 sec      Urinalysis Basic - ( 27 Mar 2024 07:23 )    Color: x / Appearance: x / SG: x / pH: x  Gluc: 81 mg/dL / Ketone: x  / Bili: x / Urobili: x   Blood: x / Protein: x / Nitrite: x   Leuk Esterase: x / RBC: x / WBC x   Sq Epi: x / Non Sq Epi: x / Bacteria: x                RADIOLOGY & ADDITIONAL STUDIES:

## 2024-03-27 NOTE — PROGRESS NOTE ADULT - SUBJECTIVE AND OBJECTIVE BOX
RAMONE ROMANO, MRN [id number], 78yfemale      Patient is a 78y old  Female who presents with a chief complaint of hypotension (27 Mar 2024 12:15)       INTERVAL HPI/OVERNIGHT EVENTS:  Patient seen and evaluated at bedside.  Pt is resting comfortable in NAD. Denies N/V/F/C.    Allergies    No Known Allergies    Intolerances        Vital Signs Last 24 Hrs  T(C): 37.3 (27 Mar 2024 16:12), Max: 37.3 (27 Mar 2024 16:12)  T(F): 99.1 (27 Mar 2024 16:12), Max: 99.1 (27 Mar 2024 16:12)  HR: 84 (27 Mar 2024 16:12) (65 - 84)  BP: 97/67 (27 Mar 2024 16:12) (86/47 - 105/46)  BP(mean): --  RR: 18 (27 Mar 2024 16:12) (18 - 18)  SpO2: 97% (27 Mar 2024 16:12) (96% - 99%)    Parameters below as of 27 Mar 2024 16:12  Patient On (Oxygen Delivery Method): room air        LABS:                        10.5   7.67  )-----------( 231      ( 26 Mar 2024 09:50 )             30.3     03-27    143  |  109<H>  |  15  ----------------------------<  81  4.2   |  25  |  0.88    Ca    8.0<L>      27 Mar 2024 07:23    TPro  5.1<L>  /  Alb  3.1<L>  /  TBili  0.6  /  DBili  x   /  AST  56<H>  /  ALT  37  /  AlkPhos  58  03-26    PTT - ( 27 Mar 2024 07:22 )  PTT:27.5 sec  Urinalysis Basic - ( 27 Mar 2024 07:23 )    Color: x / Appearance: x / SG: x / pH: x  Gluc: 81 mg/dL / Ketone: x  / Bili: x / Urobili: x   Blood: x / Protein: x / Nitrite: x   Leuk Esterase: x / RBC: x / WBC x   Sq Epi: x / Non Sq Epi: x / Bacteria: x      CAPILLARY BLOOD GLUCOSE          Lower Extremity Physical Exam:    Vascular: DP/PT 1/4, B/L, CFT <3 seconds B/L, Temperature gradient warm to cool, B/L.   Neuro: Epicritic sensation intact to the level of digits, B/L.  Musculoskeletal/Ortho: no pain on palpation, no tenderness of b/l feet, pt unable to ambulate or ROM ankle joint, able to wiggle toes.   Skin: b/l elongated painful fungal toe nails, no open wounds or signs of infection. No ecchymosis, no edema.       RADIOLOGY & ADDITIONAL TESTS:

## 2024-03-27 NOTE — PROGRESS NOTE ADULT - SUBJECTIVE AND OBJECTIVE BOX
[facility] Division of Hospital Medicine  Penelope Helm MD  Available via MS Teams    SUBJECTIVE / OVERNIGHT EVENTS:  no acute events overnight   pt continues to feel better overall  slept better, was able to eat more this AM, denies any CP, SOB, no abd pain  endorsing some b/l LE swelling     ADDITIONAL REVIEW OF SYSTEMS:  14 point ROS negative except as noted above     MEDICATIONS  (STANDING):  apixaban 10 milliGRAM(s) Oral two times a day  influenza  Vaccine (HIGH DOSE) 0.7 milliLiter(s) IntraMuscular once    MEDICATIONS  (PRN):  acetaminophen     Tablet .. 650 milliGRAM(s) Oral every 6 hours PRN Temp greater or equal to 38C (100.4F), Mild Pain (1 - 3)  aluminum hydroxide/magnesium hydroxide/simethicone Suspension 30 milliLiter(s) Oral every 4 hours PRN Dyspepsia  melatonin 3 milliGRAM(s) Oral at bedtime PRN Insomnia  ondansetron Injectable 4 milliGRAM(s) IV Push every 8 hours PRN Nausea and/or Vomiting      I&O's Summary    26 Mar 2024 07:01  -  27 Mar 2024 07:00  --------------------------------------------------------  IN: 0 mL / OUT: 800 mL / NET: -800 mL        PHYSICAL EXAM:  Vital Signs Last 24 Hrs  T(C): 36.9 (27 Mar 2024 08:45), Max: 37.2 (26 Mar 2024 15:49)  T(F): 98.4 (27 Mar 2024 08:45), Max: 99 (26 Mar 2024 15:49)  HR: 77 (27 Mar 2024 08:45) (65 - 77)  BP: 105/46 (27 Mar 2024 08:45) (86/47 - 105/46)  BP(mean): --  RR: 18 (27 Mar 2024 08:45) (18 - 18)  SpO2: 99% (27 Mar 2024 08:45) (96% - 99%)    Parameters below as of 27 Mar 2024 08:45  Patient On (Oxygen Delivery Method): room air      PHYSICAL EXAM:  GENERAL: NAD, frail, chronically ill appearing   HEAD:  Atraumatic, normocephalic  EYES: EOMI, conjunctiva and sclera clear  NECK: Supple, no JVD  CHEST/LUNG: Clear to auscultation bilaterally; no wheezing or rales  HEART: Regular rate and rhythm; no murmurs, no LE edema   ABDOMEN: Soft, nontender, nondistended; bowel sounds present  EXTREMITIES:  2+ Peripheral Pulses, no clubbing, cyanosis; +onychomycosis   PSYCH: AAOx3, calm affect, not anxious  SKIN: No rashes or lesions    LABS:                        10.5   7.67  )-----------( 231      ( 26 Mar 2024 09:50 )             30.3     03-27    143  |  109<H>  |  15  ----------------------------<  81  4.2   |  25  |  0.88    Ca    8.0<L>      27 Mar 2024 07:23    TPro  5.1<L>  /  Alb  3.1<L>  /  TBili  0.6  /  DBili  x   /  AST  56<H>  /  ALT  37  /  AlkPhos  58  03-26    PTT - ( 27 Mar 2024 07:22 )  PTT:27.5 sec  CARDIAC MARKERS ( 26 Mar 2024 09:49 )  x     / x     / 503 U/L / x     / x          Urinalysis Basic - ( 27 Mar 2024 07:23 )    Color: x / Appearance: x / SG: x / pH: x  Gluc: 81 mg/dL / Ketone: x  / Bili: x / Urobili: x   Blood: x / Protein: x / Nitrite: x   Leuk Esterase: x / RBC: x / WBC x   Sq Epi: x / Non Sq Epi: x / Bacteria: x            RADIOLOGY & ADDITIONAL TESTS:  New Imaging Personally Reviewed Today:  New Electrocardiogram Personally Reviewed Today:  Other Results Reviewed Today:   Prior or Outpatient Records Reviewed Today with Summary:    COORDINATION OF CARE:  Consultant Communication and Details of Discussion (where applicable):

## 2024-03-27 NOTE — PROGRESS NOTE ADULT - ASSESSMENT
78F with possible L talus fx   - Pt seen and evaluated   - Afebrile, no leukocytosis   - Neurovascular status intact, no pain on palpation, no tenderness of b/l feet, pt unable to ambulate or ROM ankle joint, able to wiggle toes. b/l elongated painful fungal toe nails, no open wounds or signs of infection. No ecchymosis, no edema.   - Left foot xray: Acute minimally displaced fracture involving the left inferior aspect of the anterior calcaneal articular surface of the talus although this finding may be artifactual due to tilting of the foot.  - LLE CT: no acute fracture   - rec zflo at all times while pt in bed   - All elongated, dystrophic and thickened nails x 10 trimmed using sterile nail nipper, afterwards Bacitracin applied to all nails, tolerated well.   - Stable for discharge from podiatry standpoint  - Follow up information listed in d/c note provider   - DIscussed with attending

## 2024-03-27 NOTE — PROGRESS NOTE ADULT - ASSESSMENT
79 y/o F with PMH of Parkinson's disease, HTN p/w hypothermia, hypotension s/p ?fall found on the floor by neighbor. Pt initially admitted to MICU for hypovolemic vs septic shock, temporary Levophed gtt. She was downgraded to medical floor s/p IVF resuscitation. Noted to have Afib and found to have PE. Pulmonology consulted to PE.     CT scan of the chest showed small segmental PE, Trop peaked to 153 and then downtrended. Pro-BNP 3354. Echo showed no signs of RV failure, slight increased LV wall thickness, with EF ~65% w/o regional wall abnormalities, normal RV, Pul systolic pressure 25 mmHg, TAPSE 2.9 cm, mildly dilated LA, mild TR. LE doppler US showed no evidence of deep venous thrombosis in the right lower extremity. 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 Currently on heparin drip.   Based on the pt's history, imaging, likely provoked PE in the setting of immobilization however can not rule out underlying malignancy or other etiologies as underlying cause of PE. PESI score: 77 points, class II [low risk :1.7-3.5% 30 day mortality].     PLAN:   - c/w Eliquis 10 mg BID for 7 days followed by 5mg BID thereafter   - please obtain Rapid HIV test  - oupt sleep study to rule out sleep apnea  - follow up with heme/onc outpt for hypercoagulable studies   - age appropriate cancer screening    77 y/o F with PMH of Parkinson's disease, HTN p/w hypothermia, hypotension s/p ?fall found on the floor by neighbor. Pt initially admitted to MICU for hypovolemic vs septic shock, temporary Levophed gtt. She was downgraded to medical floor s/p IVF resuscitation. Noted to have Afib and found to have PE. Pulmonology consulted to PE.     CT scan of the chest showed small segmental PE, Trop peaked to 153 and then downtrended. Pro-BNP 3354. Echo showed no signs of RV failure, slight increased LV wall thickness, with EF ~65% w/o regional wall abnormalities, normal RV, Pul systolic pressure 25 mmHg, TAPSE 2.9 cm, mildly dilated LA, mild TR. LE doppler US showed no evidence of deep venous thrombosis in the right lower extremity. 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 Currently on heparin drip.   Based on the pt's history, imaging, likely provoked PE in the setting of immobilization however can not rule out underlying malignancy or other etiologies as underlying cause of PE. PESI score: 77 points, class II [low risk :1.7-3.5% 30 day mortality].     PLAN:   - c/w Eliquis 10 mg BID for 7 days followed by 5mg BID thereafter   - oupt sleep study to rule out sleep apnea  - follow up with heme/onc outpt for hypercoagulable studies   - age appropriate cancer screening

## 2024-03-28 LAB
ANION GAP SERPL CALC-SCNC: 13 MMOL/L — SIGNIFICANT CHANGE UP (ref 5–17)
BUN SERPL-MCNC: 15 MG/DL — SIGNIFICANT CHANGE UP (ref 7–23)
CALCIUM SERPL-MCNC: 8.6 MG/DL — SIGNIFICANT CHANGE UP (ref 8.4–10.5)
CHLORIDE SERPL-SCNC: 105 MMOL/L — SIGNIFICANT CHANGE UP (ref 96–108)
CK SERPL-CCNC: 240 U/L — HIGH (ref 25–170)
CO2 SERPL-SCNC: 23 MMOL/L — SIGNIFICANT CHANGE UP (ref 22–31)
CREAT SERPL-MCNC: 0.8 MG/DL — SIGNIFICANT CHANGE UP (ref 0.5–1.3)
CULTURE RESULTS: SIGNIFICANT CHANGE UP
CULTURE RESULTS: SIGNIFICANT CHANGE UP
EGFR: 76 ML/MIN/1.73M2 — SIGNIFICANT CHANGE UP
GLUCOSE SERPL-MCNC: 145 MG/DL — HIGH (ref 70–99)
HCT VFR BLD CALC: 32 % — LOW (ref 34.5–45)
HGB BLD-MCNC: 11 G/DL — LOW (ref 11.5–15.5)
HIV 1+2 AB+HIV1 P24 AG SERPL QL IA: SIGNIFICANT CHANGE UP
MCHC RBC-ENTMCNC: 30.3 PG — SIGNIFICANT CHANGE UP (ref 27–34)
MCHC RBC-ENTMCNC: 34.4 GM/DL — SIGNIFICANT CHANGE UP (ref 32–36)
MCV RBC AUTO: 88.2 FL — SIGNIFICANT CHANGE UP (ref 80–100)
NRBC # BLD: 0 /100 WBCS — SIGNIFICANT CHANGE UP (ref 0–0)
PLATELET # BLD AUTO: 276 K/UL — SIGNIFICANT CHANGE UP (ref 150–400)
POTASSIUM SERPL-MCNC: 4.4 MMOL/L — SIGNIFICANT CHANGE UP (ref 3.5–5.3)
POTASSIUM SERPL-SCNC: 4.4 MMOL/L — SIGNIFICANT CHANGE UP (ref 3.5–5.3)
RBC # BLD: 3.63 M/UL — LOW (ref 3.8–5.2)
RBC # FLD: 14.7 % — HIGH (ref 10.3–14.5)
SODIUM SERPL-SCNC: 141 MMOL/L — SIGNIFICANT CHANGE UP (ref 135–145)
SPECIMEN SOURCE: SIGNIFICANT CHANGE UP
SPECIMEN SOURCE: SIGNIFICANT CHANGE UP
WBC # BLD: 7.97 K/UL — SIGNIFICANT CHANGE UP (ref 3.8–10.5)
WBC # FLD AUTO: 7.97 K/UL — SIGNIFICANT CHANGE UP (ref 3.8–10.5)

## 2024-03-28 PROCEDURE — 99232 SBSQ HOSP IP/OBS MODERATE 35: CPT

## 2024-03-28 PROCEDURE — 99233 SBSQ HOSP IP/OBS HIGH 50: CPT | Mod: GC

## 2024-03-28 RX ADMIN — APIXABAN 10 MILLIGRAM(S): 2.5 TABLET, FILM COATED ORAL at 05:38

## 2024-03-28 RX ADMIN — APIXABAN 10 MILLIGRAM(S): 2.5 TABLET, FILM COATED ORAL at 18:33

## 2024-03-28 NOTE — PROGRESS NOTE ADULT - SUBJECTIVE AND OBJECTIVE BOX
[facility] Division of Hospital Medicine  Penelope Helm MD  Available via MS Teams    SUBJECTIVE / OVERNIGHT EVENTS:  no acute events overnight  pt feeling well overall  no acute complaints, no CP, SOB, no abd pain  +constipation     ADDITIONAL REVIEW OF SYSTEMS:  14 point ROS negative except as noted above     MEDICATIONS  (STANDING):  apixaban 10 milliGRAM(s) Oral two times a day  influenza  Vaccine (HIGH DOSE) 0.7 milliLiter(s) IntraMuscular once    MEDICATIONS  (PRN):  acetaminophen     Tablet .. 650 milliGRAM(s) Oral every 6 hours PRN Temp greater or equal to 38C (100.4F), Mild Pain (1 - 3)  aluminum hydroxide/magnesium hydroxide/simethicone Suspension 30 milliLiter(s) Oral every 4 hours PRN Dyspepsia  melatonin 3 milliGRAM(s) Oral at bedtime PRN Insomnia  ondansetron Injectable 4 milliGRAM(s) IV Push every 8 hours PRN Nausea and/or Vomiting      I&O's Summary      PHYSICAL EXAM:  Vital Signs Last 24 Hrs  T(C): 37.2 (28 Mar 2024 09:03), Max: 37.3 (27 Mar 2024 16:12)  T(F): 98.9 (28 Mar 2024 09:03), Max: 99.1 (27 Mar 2024 16:12)  HR: 76 (28 Mar 2024 09:03) (71 - 84)  BP: 99/62 (28 Mar 2024 09:03) (97/67 - 113/57)  BP(mean): --  RR: 18 (28 Mar 2024 09:03) (18 - 18)  SpO2: 99% (28 Mar 2024 09:03) (97% - 99%)    Parameters below as of 28 Mar 2024 09:03  Patient On (Oxygen Delivery Method): room air      PHYSICAL EXAM:  GENERAL: NAD, frail, chronically ill appearing   HEAD:  Atraumatic, normocephalic  EYES: EOMI, conjunctiva and sclera clear  NECK: Supple, no JVD  CHEST/LUNG: Clear to auscultation bilaterally; no wheezing or rales  HEART: Regular rate and rhythm; no murmurs, no LE edema   ABDOMEN: Soft, +mild TTP, nondistended; bowel sounds present  EXTREMITIES:  2+ Peripheral Pulses, no clubbing, cyanosis; +onychomycosis   PSYCH: AAOx3, calm affect, not anxious  SKIN: No rashes or lesions    LABS:                        11.0   7.97  )-----------( 276      ( 28 Mar 2024 09:15 )             32.0     03-28    141  |  105  |  15  ----------------------------<  145<H>  4.4   |  23  |  0.80    Ca    8.6      28 Mar 2024 09:15      PTT - ( 27 Mar 2024 07:22 )  PTT:27.5 sec  CARDIAC MARKERS ( 28 Mar 2024 09:15 )  x     / x     / 240 U/L / x     / x          Urinalysis Basic - ( 28 Mar 2024 09:15 )    Color: x / Appearance: x / SG: x / pH: x  Gluc: 145 mg/dL / Ketone: x  / Bili: x / Urobili: x   Blood: x / Protein: x / Nitrite: x   Leuk Esterase: x / RBC: x / WBC x   Sq Epi: x / Non Sq Epi: x / Bacteria: x            RADIOLOGY & ADDITIONAL TESTS:  New Imaging Personally Reviewed Today:  New Electrocardiogram Personally Reviewed Today:  Other Results Reviewed Today:   Prior or Outpatient Records Reviewed Today with Summary:    COORDINATION OF CARE:  Consultant Communication and Details of Discussion (where applicable):

## 2024-03-28 NOTE — PROGRESS NOTE ADULT - ASSESSMENT
79 y/o F with PMH of Parkinson's disease, HTN, initially brought in by EMS after being found down on the floor by a neighbor. Pt found to be hypothermic, hypotensive, and dehydrated, transferred to MICU for further management of shock, likely hypovolemic vs septic, requiring levophed gtt. Pt taken off of pressors 15 minutes after arrival to MICU. Pt was started on IVF for volume resuscitation and RUTH for starvation ketosis. Her mental status improved with IVF. On heparin drip for possible new afib and PE. Pt now downgraded to the floor.  pt with no pmhx presents to ED c/o abd pain since yesterday with nausea, no vomiting. pain is sharp, nonradiating, moderate. denies exacerbating or relieving factors. Denies fever/chill/HA/dizziness/chest pain/palpitation/sob/v/d/ black stool/bloody stool/urinary sxs

## 2024-03-28 NOTE — PROGRESS NOTE ADULT - ASSESSMENT
77YO Female y/o F with PMH of Parkinson's disease, HTN p/w hypothermia, hypotension s/p ?fall found on the floor by neighbor. Pt initially admitted to MICU for hypovolemic vs septic shock, temporary Levophed gtt. She was downgraded to medical floor s/p IVF resuscitation. Noted to have Afib and found to have PE. Pulmonology consulted to PE.     CT scan of the chest showed small segmental PE, Trop peaked to 153 and then downtrended. Pro-BNP 3354. Echo showed no signs of RV failure, slight increased LV wall thickness, with EF ~65% w/o regional wall abnormalities, normal RV, Pul systolic pressure 25 mmHg, TAPSE 2.9 cm, mildly dilated LA, mild TR. LE doppler US showed no evidence of deep venous thrombosis in the right lower extremity. 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 Currently on heparin drip.   Based on the pt's history, imaging, likely provoked PE in the setting of immobilization however can not rule out underlying malignancy or other etiologies as underlying cause of PE. PESI score: 77 points, class II [low risk :1.7-3.5% 30 day mortality].     PLAN:   - c/w Eliquis 10 mg BID for 7 days followed by 5mg BID thereafter   - oupt sleep study to rule out sleep apnea  - follow up with heme/onc outpt for hypercoagulable studies   - age appropriate cancer screening     Case discussed with Dr. Calix

## 2024-03-28 NOTE — PROGRESS NOTE ADULT - NSPROGADDITIONALINFOA_GEN_ALL_CORE
d/c planning to [place], pending choices, will need auth
time spent reviewing prior charts, meds, discussing plan with patient and family = 54 minutes    d/w ACP Tricia ; obtain Podiatry consult for severe onchomycosis    Pt lives alone and appears unable to care for herself  Discussed with pt's brother Amadou and his wife Mervat who live in [place]; pt is ok with them being updated on her care  Yet to make them HCP or surrogate   PT eval pending

## 2024-03-28 NOTE — PROGRESS NOTE ADULT - TIME BILLING
Review of patient records, including history, laboratory data, imaging. Patient evaluation and assessment. Coordination of care. Time excludes teaching
Review of medical records, labs, coordination of care and did not include time spent teaching.
- Ordering, reviewing, and interpreting labs, testing, and imaging.  - Independently obtaining a review of systems and performing a physical exam  - Reviewing consultant documentation  - Counselling and educating patient regarding interpretation of aforementioned items and plan of care.
chart reviewing, history taking, physical exam, assessment and documentation, including speaking to specialist and CM regarding the management.

## 2024-03-28 NOTE — PROGRESS NOTE ADULT - ATTENDING COMMENTS
78 year old female with history of PD (not on meds) presented with septic v. hypovolemic shock, found to have a small subsegmental PE for which pulmonary is consulted.   Mild troponinemia, although difficult to interpret in the setting of shock. Also with BNP >3k . Otherwise no signs of RV dysfunction on echocardiogram or imaging. She is not hypoxic, nor tachycardic and is otherwise hemodynamically stable.   Suspect this was an incidental finding more than a  of her decompensation on admission.     - Would transition to Eliquis for AC, dosing as above  - Ambulatory pulse ox  - PT/OT  - IS to bedside  - Acapella to facilitate airways clearance  - Defer further work up of routine cancer screening and hypercoagulable work up to outpatient setting  - Otherwise as above
78 year old female with history of PD (not on meds) presented with septic v. hypovolemic shock, found to have a small subsegmental PE for which pulmonary is consulted.   Mild troponinemia, although difficult to interpret in the setting of shock. Also with BNP >3k . Otherwise no signs of RV dysfunction on echocardiogram or imaging. She is not hypoxic, nor tachycardic and is otherwise hemodynamically stable.   Suspect this was an incidental finding more than a  of her decompensation on admission.     Discharge planning is underway with primary team.    - Continue Eliquis for AC, dosing as above  - Ambulatory pulse ox  - PT/OT  - IS to bedside  - Acapella to facilitate airways clearance  - Defer further work up of routine cancer screening and hypercoagulable work up to outpatient setting  - Otherwise as above
78 year old female with history of PD (not on meds) presented with septic v. hypovolemic shock, found to have a small subsegmental PE for which pulmonary is consulted.   Mild troponinemia, although difficult to interpret in the setting of shock. Also with BNP >3k . Otherwise no signs of RV dysfunction on echocardiogram or imaging. She is not hypoxic, nor tachycardic and is otherwise hemodynamically stable.   Suspect this was an incidental finding more than a  of her decompensation on admission.     Discharge planning is underway with primary team.    - Continue Eliquis for AC, dosing as above  - Ambulatory pulse ox  - PT/OT  - IS to bedside  - Acapella to facilitate airways clearance  - Defer further work up of routine cancer screening and hypercoagulable work up to outpatient setting  - Otherwise as above
79 yo F w/ PMHx HTN, Parkinson Disease, hypothyroidism, mitral regurg found down on the floor since Tuesday (x 5 days) by neighbor OCTAVIANO with limited medical history p/w ED  Hypothermia 36.6*F, Hypotension 60/40 mmHg, Dehydration, Hypernatremia s/p 3 Li IVF resuscitation, Solumedrol 125mg started on IV Pressor. Initially in Afib on presentation, now in SR.    #Septic Shock i/s/o Hypothermia, resolved  - A&O x 2-3 and FC x 4 answering questions/providing detailed history  - hypothermia, hypotension resolved  - TTE  - Azithromycin/Ceftriaxone for possible CAP given hypothermia/leukocytosis  #PE, subsegmental RLL   - on RA with CTA small segmental PE without RV Strain  - Hep Gtt   - LE dopplers  #Incidental air in RIJ/right subclavian vein, unclear etiology  - asymptomatic  - mouth examined, edentulous/fitted for dentures  - neck without tenderness/no obvious lesions  #Rhabdo  - Rcvg time limited fluids  - CK not very elevated and trending down  #SILVANO  - monitor UOP, BMP  #HAGMA, multifactorial  - Rhabdo, SILVANO, Starvation Ketosis vs. Euglycemic DKA f/u   - BMP with improved HCO3 and AG  - patient tolerating diet   #DVT PPx - IV Hep Gtt   #GOC - Full Code ; Cousin Nilam Hoffmann in [place], [place] has been working on POA (which patient has been hesitant to sign) but would like for her to be her surrogate decision maker/HCP. Will call to update.

## 2024-03-28 NOTE — PROGRESS NOTE ADULT - SUBJECTIVE AND OBJECTIVE BOX
CHIEF COMPLAINT: Weakness    Interval Events: She reports doing well.    REVIEW OF SYSTEMS:  Constitutional: [ ] negative [ ] fevers [ ] chills [ ] weight loss [ ] weight gain  HEENT: [ ] negative [ ] dry eyes [ ] eye irritation [ ] postnasal drip [ ] nasal congestion  CV: [ ] negative  [ ] chest pain [ ] orthopnea [ ] palpitations [ ] murmur  Resp: [ ] negative [ ] cough [ ] shortness of breath [ ] dyspnea [ ] wheezing [ ] sputum [ ] hemoptysis  GI: [ ] negative [ ] nausea [ ] vomiting [ ] diarrhea [ ] constipation [ ] abd pain [ ] dysphagia   : [ ] negative [ ] dysuria [ ] nocturia [ ] hematuria [ ] increased urinary frequency  Musculoskeletal: [ ] negative [ ] back pain [ ] myalgias [ ] arthralgias [ ] fracture  Skin: [ ] negative [ ] rash [ ] itch  Neurological: [ ] negative [ ] headache [ ] dizziness [ ] syncope [ ] weakness [ ] numbness  Psychiatric: [ ] negative [ ] anxiety [ ] depression  Endocrine: [ ] negative [ ] diabetes [ ] thyroid problem  Hematologic/Lymphatic: [ ] negative [ ] anemia [ ] bleeding problem  Allergic/Immunologic: [ ] negative [ ] itchy eyes [ ] nasal discharge [ ] hives [ ] angioedema  [x ] All other systems negative  [ ] Unable to assess ROS because ________    OBJECTIVE:  ICU Vital Signs Last 24 Hrs  T(C): 37 (28 Mar 2024 16:12), Max: 37.2 (28 Mar 2024 00:00)  T(F): 98.6 (28 Mar 2024 16:12), Max: 99 (28 Mar 2024 00:00)  HR: 88 (28 Mar 2024 16:12) (71 - 88)  BP: 103/66 (28 Mar 2024 16:12) (99/60 - 113/57)  BP(mean): --  ABP: --  ABP(mean): --  RR: 18 (28 Mar 2024 16:12) (18 - 18)  SpO2: 99% (28 Mar 2024 16:12) (99% - 99%)    O2 Parameters below as of 28 Mar 2024 16:12  Patient On (Oxygen Delivery Method): room air        CAPILLARY BLOOD GLUCOSE    PHYSICAL EXAM:  General: Female laying in bed in no acute distress  HEENT: Normal sclera  Respiratory: CTA  Cardiovascular: Regular rate and rhythm no m/r/g  Abdomen: Nontender nondistended  Extremities: No peripheral edema  Skin: No rashes  Neurological: No appreciable neurologic deficits  Psychiatry: Appropriate mood and affect      HOSPITAL MEDICATIONS:  MEDICATIONS  (STANDING):  apixaban 10 milliGRAM(s) Oral two times a day  influenza  Vaccine (HIGH DOSE) 0.7 milliLiter(s) IntraMuscular once    MEDICATIONS  (PRN):  acetaminophen     Tablet .. 650 milliGRAM(s) Oral every 6 hours PRN Temp greater or equal to 38C (100.4F), Mild Pain (1 - 3)  aluminum hydroxide/magnesium hydroxide/simethicone Suspension 30 milliLiter(s) Oral every 4 hours PRN Dyspepsia  melatonin 3 milliGRAM(s) Oral at bedtime PRN Insomnia  ondansetron Injectable 4 milliGRAM(s) IV Push every 8 hours PRN Nausea and/or Vomiting      LABS:                        11.0   7.97  )-----------( 276      ( 28 Mar 2024 09:15 )             32.0     Hgb Trend: 11.0<--, 10.5<--, 9.7<--, 10.2<--, 11.5<--  03-28    141  |  105  |  15  ----------------------------<  145<H>  4.4   |  23  |  0.80    Ca    8.6      28 Mar 2024 09:15      Creatinine Trend: 0.80<--, 0.88<--, 0.89<--, 0.95<--, 1.10<--, 1.23<--  PTT - ( 27 Mar 2024 07:22 )  PTT:27.5 sec  Urinalysis Basic - ( 28 Mar 2024 09:15 )    Color: x / Appearance: x / SG: x / pH: x  Gluc: 145 mg/dL / Ketone: x  / Bili: x / Urobili: x   Blood: x / Protein: x / Nitrite: x   Leuk Esterase: x / RBC: x / WBC x   Sq Epi: x / Non Sq Epi: x / Bacteria: x            MICROBIOLOGY:       RADIOLOGY:  [ ] Reviewed and interpreted by me    PULMONARY FUNCTION TESTS:    EKG:

## 2024-03-29 ENCOUNTER — TRANSCRIPTION ENCOUNTER (OUTPATIENT)
Age: 79
End: 2024-03-29

## 2024-03-29 VITALS
RESPIRATION RATE: 18 BRPM | OXYGEN SATURATION: 97 % | HEART RATE: 86 BPM | SYSTOLIC BLOOD PRESSURE: 106 MMHG | TEMPERATURE: 99 F | DIASTOLIC BLOOD PRESSURE: 66 MMHG

## 2024-03-29 PROBLEM — I10 ESSENTIAL (PRIMARY) HYPERTENSION: Chronic | Status: ACTIVE | Noted: 2024-03-23

## 2024-03-29 PROBLEM — Z86.69 PERSONAL HISTORY OF OTHER DISEASES OF THE NERVOUS SYSTEM AND SENSE ORGANS: Chronic | Status: ACTIVE | Noted: 2024-03-23

## 2024-03-29 LAB — SARS-COV-2 RNA SPEC QL NAA+PROBE: SIGNIFICANT CHANGE UP

## 2024-03-29 PROCEDURE — 85014 HEMATOCRIT: CPT

## 2024-03-29 PROCEDURE — P9045: CPT

## 2024-03-29 PROCEDURE — 86803 HEPATITIS C AB TEST: CPT

## 2024-03-29 PROCEDURE — 87389 HIV-1 AG W/HIV-1&-2 AB AG IA: CPT

## 2024-03-29 PROCEDURE — 82550 ASSAY OF CK (CPK): CPT

## 2024-03-29 PROCEDURE — 81001 URINALYSIS AUTO W/SCOPE: CPT

## 2024-03-29 PROCEDURE — 70450 CT HEAD/BRAIN W/O DYE: CPT | Mod: MC

## 2024-03-29 PROCEDURE — 84145 PROCALCITONIN (PCT): CPT

## 2024-03-29 PROCEDURE — 84132 ASSAY OF SERUM POTASSIUM: CPT

## 2024-03-29 PROCEDURE — 85610 PROTHROMBIN TIME: CPT

## 2024-03-29 PROCEDURE — 99232 SBSQ HOSP IP/OBS MODERATE 35: CPT

## 2024-03-29 PROCEDURE — 84295 ASSAY OF SERUM SODIUM: CPT

## 2024-03-29 PROCEDURE — 82010 KETONE BODYS QUAN: CPT

## 2024-03-29 PROCEDURE — 83880 ASSAY OF NATRIURETIC PEPTIDE: CPT

## 2024-03-29 PROCEDURE — 76705 ECHO EXAM OF ABDOMEN: CPT

## 2024-03-29 PROCEDURE — 84484 ASSAY OF TROPONIN QUANT: CPT

## 2024-03-29 PROCEDURE — 76377 3D RENDER W/INTRP POSTPROCES: CPT

## 2024-03-29 PROCEDURE — 96375 TX/PRO/DX INJ NEW DRUG ADDON: CPT

## 2024-03-29 PROCEDURE — 80053 COMPREHEN METABOLIC PANEL: CPT

## 2024-03-29 PROCEDURE — 82947 ASSAY GLUCOSE BLOOD QUANT: CPT

## 2024-03-29 PROCEDURE — 82803 BLOOD GASES ANY COMBINATION: CPT

## 2024-03-29 PROCEDURE — 87635 SARS-COV-2 COVID-19 AMP PRB: CPT

## 2024-03-29 PROCEDURE — 87637 SARSCOV2&INF A&B&RSV AMP PRB: CPT

## 2024-03-29 PROCEDURE — 85520 HEPARIN ASSAY: CPT

## 2024-03-29 PROCEDURE — 96374 THER/PROPH/DIAG INJ IV PUSH: CPT

## 2024-03-29 PROCEDURE — 71260 CT THORAX DX C+: CPT | Mod: MC

## 2024-03-29 PROCEDURE — 87040 BLOOD CULTURE FOR BACTERIA: CPT

## 2024-03-29 PROCEDURE — 83605 ASSAY OF LACTIC ACID: CPT

## 2024-03-29 PROCEDURE — 85027 COMPLETE CBC AUTOMATED: CPT

## 2024-03-29 PROCEDURE — 97161 PT EVAL LOW COMPLEX 20 MIN: CPT

## 2024-03-29 PROCEDURE — 85018 HEMOGLOBIN: CPT

## 2024-03-29 PROCEDURE — 85730 THROMBOPLASTIN TIME PARTIAL: CPT

## 2024-03-29 PROCEDURE — 85025 COMPLETE CBC W/AUTO DIFF WBC: CPT

## 2024-03-29 PROCEDURE — 84443 ASSAY THYROID STIM HORMONE: CPT

## 2024-03-29 PROCEDURE — 74177 CT ABD & PELVIS W/CONTRAST: CPT | Mod: MC

## 2024-03-29 PROCEDURE — 80048 BASIC METABOLIC PNL TOTAL CA: CPT

## 2024-03-29 PROCEDURE — 96376 TX/PRO/DX INJ SAME DRUG ADON: CPT

## 2024-03-29 PROCEDURE — 99291 CRITICAL CARE FIRST HOUR: CPT | Mod: 25

## 2024-03-29 PROCEDURE — 83735 ASSAY OF MAGNESIUM: CPT

## 2024-03-29 PROCEDURE — 82330 ASSAY OF CALCIUM: CPT

## 2024-03-29 PROCEDURE — 82435 ASSAY OF BLOOD CHLORIDE: CPT

## 2024-03-29 PROCEDURE — 72125 CT NECK SPINE W/O DYE: CPT | Mod: MC

## 2024-03-29 PROCEDURE — 93356 MYOCRD STRAIN IMG SPCKL TRCK: CPT

## 2024-03-29 PROCEDURE — 93306 TTE W/DOPPLER COMPLETE: CPT

## 2024-03-29 PROCEDURE — 83036 HEMOGLOBIN GLYCOSYLATED A1C: CPT

## 2024-03-29 PROCEDURE — 93005 ELECTROCARDIOGRAM TRACING: CPT

## 2024-03-29 PROCEDURE — 93970 EXTREMITY STUDY: CPT

## 2024-03-29 PROCEDURE — 73700 CT LOWER EXTREMITY W/O DYE: CPT | Mod: MC

## 2024-03-29 PROCEDURE — 84100 ASSAY OF PHOSPHORUS: CPT

## 2024-03-29 PROCEDURE — 97110 THERAPEUTIC EXERCISES: CPT

## 2024-03-29 PROCEDURE — 82962 GLUCOSE BLOOD TEST: CPT

## 2024-03-29 PROCEDURE — 73630 X-RAY EXAM OF FOOT: CPT

## 2024-03-29 PROCEDURE — 87086 URINE CULTURE/COLONY COUNT: CPT

## 2024-03-29 PROCEDURE — 36415 COLL VENOUS BLD VENIPUNCTURE: CPT

## 2024-03-29 PROCEDURE — 84439 ASSAY OF FREE THYROXINE: CPT

## 2024-03-29 PROCEDURE — 97116 GAIT TRAINING THERAPY: CPT

## 2024-03-29 RX ORDER — SENNA PLUS 8.6 MG/1
2 TABLET ORAL AT BEDTIME
Refills: 0 | Status: DISCONTINUED | OUTPATIENT
Start: 2024-03-29 | End: 2024-03-29

## 2024-03-29 RX ORDER — APIXABAN 2.5 MG/1
2 TABLET, FILM COATED ORAL
Qty: 0 | Refills: 0 | DISCHARGE
Start: 2024-03-29

## 2024-03-29 RX ORDER — VALSARTAN 80 MG/1
1 TABLET ORAL
Refills: 0 | DISCHARGE

## 2024-03-29 RX ORDER — ACETAMINOPHEN 500 MG
2 TABLET ORAL
Qty: 0 | Refills: 0 | DISCHARGE
Start: 2024-03-29

## 2024-03-29 RX ORDER — POLYETHYLENE GLYCOL 3350 17 G/17G
17 POWDER, FOR SOLUTION ORAL
Qty: 0 | Refills: 0 | DISCHARGE
Start: 2024-03-29

## 2024-03-29 RX ORDER — POLYETHYLENE GLYCOL 3350 17 G/17G
17 POWDER, FOR SOLUTION ORAL
Refills: 0 | Status: DISCONTINUED | OUTPATIENT
Start: 2024-03-29 | End: 2024-03-29

## 2024-03-29 RX ORDER — SENNA PLUS 8.6 MG/1
2 TABLET ORAL
Qty: 0 | Refills: 0 | DISCHARGE
Start: 2024-03-29

## 2024-03-29 RX ORDER — FUROSEMIDE 40 MG
1 TABLET ORAL
Refills: 0 | DISCHARGE

## 2024-03-29 RX ORDER — POLYETHYLENE GLYCOL 3350 17 G/17G
17 POWDER, FOR SOLUTION ORAL ONCE
Refills: 0 | Status: COMPLETED | OUTPATIENT
Start: 2024-03-29 | End: 2024-03-29

## 2024-03-29 RX ADMIN — POLYETHYLENE GLYCOL 3350 17 GRAM(S): 17 POWDER, FOR SOLUTION ORAL at 11:47

## 2024-03-29 RX ADMIN — APIXABAN 10 MILLIGRAM(S): 2.5 TABLET, FILM COATED ORAL at 05:28

## 2024-03-29 RX ADMIN — APIXABAN 10 MILLIGRAM(S): 2.5 TABLET, FILM COATED ORAL at 17:43

## 2024-03-29 NOTE — PROGRESS NOTE ADULT - PROBLEM SELECTOR PLAN 6
- New?, transient afib on initial EKG but in sinus since  - On heparin infusion for PE --> will switch to Eliquis   - Rates improved   - TTE as above
-Na 147 -> 150-->148, free water deficit 2L  -c/w D5+LR @ 100cc/hr for now  -monitor I/Os, pt has joleen
- New?, transient afib on initial EKG but in sinus since  - On heparin infusion for PE --> now switched to Eliquis   - Rates improved   - TTE as above
-Na 147 -> 150-->148, free water deficit 2L  -c/w D5+LR @ 100cc/hr for now  -monitor I/Os, pt has joleen

## 2024-03-29 NOTE — PROGRESS NOTE ADULT - PROBLEM SELECTOR PROBLEM 11
[facility] DIVISION OF KIDNEY DISEASES AND HYPERTENSION -- HEMODIALYSIS NOTE  --------------------------------------------------------------------------------  Chief Complaint: ESRD/Ongoing hemodialysis requirement    24 hour events/subjective:   seen during HD. tolerating well.    PAST HISTORY  --------------------------------------------------------------------------------  No significant changes to PMH, PSH, FHx, SHx, unless otherwise noted    ALLERGIES & MEDICATIONS  --------------------------------------------------------------------------------  Allergies    No Known Allergies    Intolerances      Standing Inpatient Medications    PRN Inpatient Medications      REVIEW OF SYSTEMS  --------------------------------------------------------------------------------  Gen: No weight changes, fatigue, fevers/chills, weakness  Skin: No rashes  Head/Eyes/Ears/Mouth: No headache; Normal hearing; Normal vision w/o blurriness; No sinus pain/discomfort, sore throat  Respiratory: No dyspnea, cough, wheezing, hemoptysis  CV: No chest pain, PND, orthopnea  GI: No abdominal pain, diarrhea, constipation, nausea, vomiting, melena, hematochezia  : No increased frequency, dysuria, hematuria, nocturia  MSK: No joint pain/swelling; no back pain; no edema  Neuro: No dizziness/lightheadedness, weakness, seizures, numbness, tingling  Heme: No easy bruising or bleeding  Endo: No heat/cold intolerance  Psych: No significant nervousness, anxiety, stress, depression    All other systems were reviewed and are negative, except as noted.    VITALS/PHYSICAL EXAM  --------------------------------------------------------------------------------  T(C): 36.7 (12-27-17 @ 09:34), Max: 37.1 (12-26-17 @ 15:45)  HR: 94 (12-27-17 @ 09:34) (83 - 102)  BP: 130/83 (12-27-17 @ 09:34) (127/69 - 135/79)  RR: 18 (12-27-17 @ 09:34) (18 - 18)  SpO2: 96% (12-27-17 @ 09:34) (96% - 100%)  Wt(kg): --  Height (cm): 144.78 (12-27-17 @ 08:17)      Physical Exam:  	Gen: NAD  	Pulm: CTA B/L  	CV: RRR, S1S2; no rub  	Abd: +BS, soft, nontender/nondistended, gravid  	: No suprapubic tenderness  	UE: Warm, no edema; no asterixis  	LE: Warm,  no edema  	Skin: Warm, without rashes  	Vascular access: kristi Alvarez+    LABS/STUDIES  --------------------------------------------------------------------------------              10.3   9.6   >-----------<  207      [12-26-17 @ 16:10]              28.7     134  |  100  |  24  ----------------------------<  72      [12-26-17 @ 16:10]  3.4   |  21  |  4.35        Ca     8.9     [12-26-17 @ 16:10]    TPro  6.3  /  Alb  3.1  /  TBili  0.1  /  DBili  x   /  AST  12  /  ALT  14  /  AlkPhos  99  [12-26-17 @ 16:10]          HbA1c 5.7      [07-15-16 @ 08:42]  TSH 0.01      [10-02-17 @ 23:50]
Imaging finding
Prophylactic measure

## 2024-03-29 NOTE — PROGRESS NOTE ADULT - PROBLEM SELECTOR PLAN 11
- ENT called for incidental findings on CT of free air from subclavian vein to R AJ and right neck  - Pt without any discomfort, pain, denies dysphagia, odynophagia, dysphonia, sob, dyspnea, changes in voice or inability to tolerated secretions  - Pt deferring any further work up 2/2 she feels fine and  is tolerating regular diet.  - pt refusing indirect laryngoscopy at this time    Dilated CBD to 8mm  -  RUQ U/S with nonspecific findings
- ENT called for incidental findings on CT of free air from subclavian vein to R AJ and right neck  - Pt without any discomfort, pain, denies dysphagia, odynophagia, dysphonia, sob, dyspnea, changes in voice or inability to tolerated secretions  - Pt deferring any further work up 2/2 she feels fine and  is tolerating regular diet.  - pt refusing indirect laryngoscopy at this time    Dilated CBD to 8mm  -  RUQ U/S with nonspecific findings
DVT ppx: heparin gtt   Diet: DASH   Dispo pending clinical improvement   PT pending
- ENT called for incidental findings on CT of free air from subclavian vein to R AJ and right neck  - Pt without any discomfort, pain, denies dysphagia, odynophagia, dysphonia, sob, dyspnea, changes in voice or inability to tolerated secretions  - Pt deferring any further work up 2/2 she feels fine and  is tolerating regular diet.  - pt refusing indirect laryngoscopy at this time    Dilated CBD to 8mm  -  RUQ U/S with nonspecific findings
- ENT called for incidental findings on CT of free air from subclavian vein to R AJ and right neck  - Pt without any discomfort, pain, denies dysphagia, odynophagia, dysphonia, sob, dyspnea, changes in voice or inability to tolerated secretions  - Pt deferring any further work up 2/2 she feels fine and  is tolerating regular diet.  - pt refusing indirect laryngoscopy at this time    Dilated CBD to 8mm  -  RUQ U/S with nonspecific findings

## 2024-03-29 NOTE — PROGRESS NOTE ADULT - PROBLEM SELECTOR PROBLEM 4
Rhabdomyolysis
Demand ischemia of myocardium
Rhabdomyolysis
Demand ischemia of myocardium

## 2024-03-29 NOTE — PROGRESS NOTE ADULT - CONVERSATION DETAILS
Discussed advanced care planning with pt with son at bedside - at this time, pt would like all resuscitative measures performed. If patient were to be incapacitated, her 3 sons would share joint decision making. Discussed advanced care planning with pt, explained DNR/DNI status and what resuscitation encompasses. At this time, pt would like to remain FULL CODE and would like resuscitative attempts to be made.

## 2024-03-29 NOTE — CHART NOTE - NSCHARTNOTEFT_GEN_A_CORE
78 year old female with history of PD (not on meds) presented with septic v. hypovolemic shock, found to have a small subsegmental PE for which pulmonary is consulted.   Mild troponinemia, although difficult to interpret in the setting of shock. Also with BNP >3k . Otherwise no signs of RV dysfunction on echocardiogram or imaging. She is not hypoxic, nor tachycardic and is otherwise hemodynamically stable.   Suspect this was an incidental finding more than a  of her decompensation on admission.     Discharge planning is underway with primary team.    - Continue Eliquis for AC, dosing as above  - Ambulatory pulse ox  - PT/OT  - IS to bedside  - Acapella to facilitate airways clearance  - Defer further work up of routine cancer screening and hypercoagulable work up to outpatient setting  - Otherwise as per resident note    Time-based billing (NON-critical care).     55 minutes spent on total encounter. The necessity of the time spent during the encounter on this date of service was due to:     Review of patient records, including history, laboratory data, imaging. Patient evaluation and assessment. Coordination of care. Time excludes teaching.

## 2024-03-29 NOTE — PROGRESS NOTE ADULT - PROBLEM SELECTOR PROBLEM 9
Talus fracture
History of Parkinson's disease
Talus fracture
History of Parkinson's disease

## 2024-03-29 NOTE — PROGRESS NOTE ADULT - PROBLEM SELECTOR PROBLEM 3
Pulmonary thromboembolism
Rhabdomyolysis
Pulmonary thromboembolism
Rhabdomyolysis

## 2024-03-29 NOTE — CHART NOTE - NSCHARTNOTESELECT_GEN_ALL_CORE
MICU Downdrage/Event Note
Pulmonary attending Addendum
Event Note
MICU Reassessment/Event Note
POCUS/Event Note

## 2024-03-29 NOTE — DISCHARGE NOTE NURSING/CASE MANAGEMENT/SOCIAL WORK - NSDCFUADDAPPT_GEN_ALL_CORE_FT
Podiatry Discharge Instructions:  Follow up: Please follow up with Dr. Ba within 1 week of discharge from the hospital, please call [phone] for appointment and discuss that you recently were seen in the hospital.  Wound Care: n/a  Weight bearing: weight bearing as tolerated in surgical shoes to bilateral, - STRICT decubitus precautions, Z- FLOWS boots at all time when in bed and in chair resting.

## 2024-03-29 NOTE — PROGRESS NOTE ADULT - ASSESSMENT
79YO Female y/o F with PMH of Parkinson's disease, HTN p/w hypothermia, hypotension s/p ?fall found on the floor by neighbor. Pt initially admitted to MICU for hypovolemic vs septic shock, temporary Levophed gtt. She was downgraded to medical floor s/p IVF resuscitation. Noted to have Afib and found to have PE. Pulmonology consulted to PE.     CT scan of the chest showed small segmental PE, Trop peaked to 153 and then downtrended. Pro-BNP 3354. Echo showed no signs of RV failure, slight increased LV wall thickness, with EF ~65% w/o regional wall abnormalities, normal RV, Pul systolic pressure 25 mmHg, TAPSE 2.9 cm, mildly dilated LA, mild TR. LE doppler US showed no evidence of deep venous thrombosis in the right lower extremity. 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 Currently on heparin drip.   Based on the pt's history, imaging, likely provoked PE in the setting of immobilization however can not rule out underlying malignancy or other etiologies as underlying cause of PE. PESI score: 77 points, class II [low risk :1.7-3.5% 30 day mortality].     PLAN:   - c/w Eliquis 10 mg BID for 7 days [started on 3/26] followed by 5mg BID thereafter   - oupt sleep study to rule out sleep apnea  - follow up with heme/onc outpt for hypercoagulable studies   - age appropriate cancer screening

## 2024-03-29 NOTE — PROGRESS NOTE ADULT - PROBLEM SELECTOR PROBLEM 5
Demand ischemia of myocardium
Afib
Demand ischemia of myocardium
Afib

## 2024-03-29 NOTE — PROGRESS NOTE ADULT - PROBLEM SELECTOR PLAN 4
-CK on admission 1565, downtrending  -holding further IVF given b/l LE swelling - encourage PO intake   -Cr improved and now stable at ~0.9 - unclear baseline   -Trend SCr, avoid nephrotoxins
-CK on admission 1565, downtrending  -holding further IVF given b/l LE swelling - encourage PO intake   -Cr improved and now stable at ~0.9 - unclear baseline   -Trend SCr, avoid nephrotoxins
Troponin 68 -> 67 -> 85-->153-->137  -EKG, V2-V4 with TWI, STD V3-V4 ~1mm, no RUFINA, pt without CP or SOB, asymptomatic. Denied hx of CAD, reports she was supposed to have stress test done at some point but did not pursue  -Trend to peak, pt asymptomatic with no CP  - TTE showing left ventricular wall thickness is mildly increased, ejection fraction visually estimated at 65% left atrium is mildly dilated, trace MVR, mild TR
-CK on admission 1565, downtrending  -c/w maintenance IVF   -Pt with eGFR 40s-50s, unclear baseline SCr  -Trend SCr, avoid nephrotoxins
Troponin 68 -> 67 -> 85-->153-->137  -EKG, V2-V4 with TWI, STD V3-V4 ~1mm, no RUFINA, pt without CP or SOB, asymptomatic. Denied hx of CAD, reports she was supposed to have stress test done at some point but did not pursue  -Trend to peak, pt asymptomatic with no CP  - TTE showing left ventricular wall thickness is mildly increased, ejection fraction visually estimated at 65% left atrium is mildly dilated, trace MVR, mild TR
-CK on admission 1565, downtrending  -holding further IVF given b/l LE swelling - encourage PO intake   -Cr improved and now stable at ~0.9 - unclear baseline   -Trend SCr, avoid nephrotoxins

## 2024-03-29 NOTE — PROGRESS NOTE ADULT - PROBLEM SELECTOR PLAN 3
-CK on admission 1565, downtrending  -c/w D5+LR for now, 100cc/hr   -Pt with eGFR 40s-50s, unclear baseline SCr  -Trend SCr, avoid nephrotoxins
-CK on admission 1565, downtrending  -c/w D5+LR for now, 100cc/hr can increase IVF rate if tolerated  -Pt with eGFR 40s-50s, unclear baseline SCr  -Trend SCr, avoid nephrotoxins
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

## 2024-03-29 NOTE — PROGRESS NOTE ADULT - PROBLEM SELECTOR PROBLEM 10
History of Parkinson's disease
History of Parkinson's disease
Imaging finding
Prophylactic measure
History of Parkinson's disease
History of Parkinson's disease

## 2024-03-29 NOTE — DISCHARGE NOTE NURSING/CASE MANAGEMENT/SOCIAL WORK - PATIENT PORTAL LINK FT
You can access the FollowMyHealth Patient Portal offered by [facility] by registering at the following website: [email or web address]. By joining [email or web address] FollowMyHealth portal, you will also be able to view your health information using other applications (apps) compatible with our system.

## 2024-03-29 NOTE — PROGRESS NOTE ADULT - PROBLEM SELECTOR PLAN 9
- pt denies being on medications for Parkinson's, notes difficulty to walk. Uses a walker at home  - Eventual neuro eval
- pt denies being on medications for Parkinson's, notes difficulty to walk. Uses a walker at home  - Eventual neuro eval
- Xray Foot shows acute minimally displaced fracture involving the L inferior aspect of the anterior calcaneal articular surface of the talus although may be artificial due to tilting of the foot  -seen by Podiatry appreciate recs, pt with no pain at site  -CT LLE noncon without fracture

## 2024-03-29 NOTE — PROGRESS NOTE ADULT - PROBLEM SELECTOR PLAN 7
HAGMA, likely 2/2 ketosis  - Suspect starvation ketosis as pt denies being on SGLT2i, denies hx of T2DM. Pt down on floor with poor PO intake. BHB 5.4 -> 4.8-->1.2  - AG closed  - A1C 5.2, trend BHB  - Diet started, c/w D5+LR, start ISS
resolved after D5/LR  - c/t monitor   - encourage hydration
HAGMA, likely 2/2 ketosis  - Suspect starvation ketosis as pt denies being on SGLT2i, denies hx of T2DM. Pt down on floor with poor PO intake. BHB 5.4 -> 4.8-->1.2  - AG closed  - A1C 5.2, trend BHB  - Diet started, c/w D5+LR, start ISS
resolved after D5/LR  - c/t monitor   - encourage hydration
resolved after D5/LR  - c/t monitor   encourage hydration
resolved after D5/LR  - c/t monitor   - encourage hydration

## 2024-03-29 NOTE — PROGRESS NOTE ADULT - PROBLEM SELECTOR PROBLEM 8
Talus fracture
Metabolic acidosis
Talus fracture
Metabolic acidosis

## 2024-03-29 NOTE — PROGRESS NOTE ADULT - ASSESSMENT
79 y/o F with PMH of Parkinson's disease, HTN, initially brought in by EMS after being found down on the floor by a neighbor. Pt found to be hypothermic, hypotensive, and dehydrated, transferred to MICU for further management of shock, likely hypovolemic vs septic, requiring levophed gtt. Pt taken off of pressors 15 minutes after arrival to MICU. Pt was started on IVF for volume resuscitation and RUTH for starvation ketosis. Her mental status improved with IVF. On heparin drip for possible new afib and PE. Pt now downgraded to the floor.

## 2024-03-29 NOTE — PROGRESS NOTE ADULT - NUTRITIONAL ASSESSMENT
This patient has been assessed with a concern for Malnutrition and has been determined to have a diagnosis/diagnoses of Severe protein-calorie malnutrition.    This patient is being managed with:   Diet DASH/TLC-  Sodium & Cholesterol Restricted  Soft and Bite Sized (SOFTBTSZ)  Liquid Protein Supplement     Qty per Day:  1  Supplement Feeding Modality:  Oral  Ensure Enlive Cans or Servings Per Day:  1       Frequency:  Daily  Entered: Mar 26 2024  1:17PM    Diet DASH/TLC-  Sodium & Cholesterol Restricted  Soft and Bite Sized (SOFTBTSZ)  Supplement Feeding Modality:  Oral  Ensure Enlive Cans or Servings Per Day:  3       Frequency:  Daily  Entered: Mar 26 2024  1:09PM    The following pending diet order is being considered for treatment of Severe protein-calorie malnutrition:null
This patient has been assessed with a concern for Malnutrition and has been determined to have a diagnosis/diagnoses of Severe protein-calorie malnutrition.    This patient is being managed with:   Diet DASH/TLC-  Sodium & Cholesterol Restricted  Supplement Feeding Modality:  Oral  Ensure Enlive Cans or Servings Per Day:  3       Frequency:  Daily  Entered: Mar 27 2024 12:17PM    Diet DASH/TLC-  Sodium & Cholesterol Restricted  Soft and Bite Sized (SOFTBTSZ)  Liquid Protein Supplement     Qty per Day:  1  Supplement Feeding Modality:  Oral  Ensure Enlive Cans or Servings Per Day:  1       Frequency:  Daily  Entered: Mar 26 2024  1:17PM    The following pending diet order is being considered for treatment of Severe protein-calorie malnutrition:null
This patient has been assessed with a concern for Malnutrition and has been determined to have a diagnosis/diagnoses of Severe protein-calorie malnutrition.    This patient is being managed with:   Diet DASH/TLC-  Sodium & Cholesterol Restricted  Supplement Feeding Modality:  Oral  Ensure Enlive Cans or Servings Per Day:  3       Frequency:  Daily  Entered: Mar 27 2024 12:17PM    Diet DASH/TLC-  Sodium & Cholesterol Restricted  Soft and Bite Sized (SOFTBTSZ)  Liquid Protein Supplement     Qty per Day:  1  Supplement Feeding Modality:  Oral  Ensure Enlive Cans or Servings Per Day:  1       Frequency:  Daily  Entered: Mar 26 2024  1:17PM    The following pending diet order is being considered for treatment of Severe protein-calorie malnutrition:null

## 2024-03-29 NOTE — PROGRESS NOTE ADULT - SUBJECTIVE AND OBJECTIVE BOX
[facility] Division of Hospital Medicine  Penelope Helm MD  Available via MS Teams    SUBJECTIVE / OVERNIGHT EVENTS:  no acute events overnight   feeing much better, ate most of her breakfast, no CP or SOB  endorsing constipation     ADDITIONAL REVIEW OF SYSTEMS:  14 point ROS negative except as noted above     MEDICATIONS  (STANDING):  apixaban 10 milliGRAM(s) Oral two times a day  influenza  Vaccine (HIGH DOSE) 0.7 milliLiter(s) IntraMuscular once  polyethylene glycol 3350 17 Gram(s) Oral two times a day  senna 2 Tablet(s) Oral at bedtime    MEDICATIONS  (PRN):  acetaminophen     Tablet .. 650 milliGRAM(s) Oral every 6 hours PRN Temp greater or equal to 38C (100.4F), Mild Pain (1 - 3)  aluminum hydroxide/magnesium hydroxide/simethicone Suspension 30 milliLiter(s) Oral every 4 hours PRN Dyspepsia  melatonin 3 milliGRAM(s) Oral at bedtime PRN Insomnia  ondansetron Injectable 4 milliGRAM(s) IV Push every 8 hours PRN Nausea and/or Vomiting      I&O's Summary    28 Mar 2024 07:01  -  29 Mar 2024 07:00  --------------------------------------------------------  IN: 0 mL / OUT: 1100 mL / NET: -1100 mL        PHYSICAL EXAM:  Vital Signs Last 24 Hrs  T(C): 36.6 (29 Mar 2024 09:00), Max: 37 (28 Mar 2024 16:12)  T(F): 97.8 (29 Mar 2024 09:00), Max: 98.6 (28 Mar 2024 16:12)  HR: 77 (29 Mar 2024 09:00) (72 - 88)  BP: 108/64 (29 Mar 2024 09:00) (94/57 - 108/64)  BP(mean): --  RR: 18 (29 Mar 2024 09:00) (18 - 18)  SpO2: 100% (29 Mar 2024 09:00) (96% - 100%)    Parameters below as of 29 Mar 2024 09:00  Patient On (Oxygen Delivery Method): room air      PHYSICAL EXAM:  GENERAL: NAD, frail, chronically ill appearing   HEAD:  Atraumatic, normocephalic  EYES: EOMI, conjunctiva and sclera clear  NECK: Supple, no JVD  CHEST/LUNG: Clear to auscultation bilaterally; no wheezing or rales  HEART: Regular rate and rhythm; no murmurs, no LE edema   ABDOMEN: Soft, +mild TTP, nondistended; bowel sounds present  EXTREMITIES:  2+ Peripheral Pulses, no clubbing, cyanosis; +onychomycosis   PSYCH: AAOx3, calm affect, not anxious  SKIN: No rashes or lesions    LABS:                        11.0   7.97  )-----------( 276      ( 28 Mar 2024 09:15 )             32.0     03-28    141  |  105  |  15  ----------------------------<  145<H>  4.4   |  23  |  0.80    Ca    8.6      28 Mar 2024 09:15        CARDIAC MARKERS ( 28 Mar 2024 09:15 )  x     / x     / 240 U/L / x     / x          Urinalysis Basic - ( 28 Mar 2024 09:15 )    Color: x / Appearance: x / SG: x / pH: x  Gluc: 145 mg/dL / Ketone: x  / Bili: x / Urobili: x   Blood: x / Protein: x / Nitrite: x   Leuk Esterase: x / RBC: x / WBC x   Sq Epi: x / Non Sq Epi: x / Bacteria: x            RADIOLOGY & ADDITIONAL TESTS:  New Imaging Personally Reviewed Today:  New Electrocardiogram Personally Reviewed Today:  Other Results Reviewed Today:   Prior or Outpatient Records Reviewed Today with Summary:    COORDINATION OF CARE:  Consultant Communication and Details of Discussion (where applicable):

## 2024-03-29 NOTE — PROGRESS NOTE ADULT - PROBLEM SELECTOR PLAN 2
pt hypothermic to 89F in ED, WBC 15-16, tachycardic to 110s, lactate 2.8 initially, meeting SIRS criteria   - unclear if from hypovolemia vs infection   - UA negative, Flu/COVID/RSV negative. CT C/A/P also negative for infectious etiology, s/p vanc and zosyn in ED  - procal 0.12  - Bcx and Ucx NGTD  - was being empirically treated for CAP, s/p azithromycin x 3 days, CTX (3/24--3/26) - will d/c all abx as no evidence of infection
Pt with filling defect suggesting small segmental PE on CTA Chest  - c/w heparin gtt  - Check lower extremity duplex
pt hypothermic to 89F in ED, WBC 15-16, tachycardic to 110s, lactate 2.8 initially, meeting SIRS criteria   - unclear if from hypovolemia vs infection   - UA negative, Flu/COVID/RSV negative. CT C/A/P also negative for infectious etiology, s/p vanc and zosyn in ED  - procal 0.12  - Bcx and Ucx NGTD  - being empirically treated for CAP, s/p azithromycin x 3 days, CTX (3/24--3/26) - will d/c all abx as no evidence of infection
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
pt hypothermic to 89F in ED, WBC 15-16, tachycardic to 110s, lactate 2.8 initially, meeting SIRS criteria   - unclear if from hypovolemia vs infection   - UA negative, Flu/COVID/RSV negative. CT C/A/P also negative for infectious etiology, s/p vanc and zosyn in ED  - procal 0.12  - Bcx and Ucx NGTD  - was being empirically treated for CAP, s/p azithromycin x 3 days, CTX (3/24--3/26) - now off all abx as no evidence of infection
pt hypothermic to 89F in ED, WBC 15-16, tachycardic to 110s, lactate 2.8 initially, meeting SIRS criteria   - unclear if from hypovolemia vs infection   - UA negative, Flu/COVID/RSV negative. CT C/A/P also negative for infectious etiology, s/p vanc and zosyn in ED  - procal 0.12  - Bcx and Ucx NGTD  - was being empirically treated for CAP, s/p azithromycin x 3 days, CTX (3/24--3/26) - now off all abx as no evidence of infection

## 2024-03-29 NOTE — PROGRESS NOTE ADULT - PROBLEM SELECTOR PLAN 1
pt admitted with severe hypotension, s/p 3.25L of IVF in ED  - admitted to MICU for pressors, but was able to be weaned off quickly   - possibly 2/2 severe dehydration/hypovolemia vs septic vs medication induced   - BP continues to improve, now in the low 100s   - encourage hydration   - c/t hold home valsartan 80 and lasix 40  - SIRS work-up as below
pt admitted with severe hypotension, s/p 3.25L of IVF in ED  - admitted to MICU for pressors, but was able to be weaned off quickly   - possibly 2/2 severe dehydration/hypovolemia vs septic vs medication induced   - BP remains soft but pt mentating well, MAP>65  - give swelling   - c/w hold home valsartan 80 and lasix 40  - SIRS work-up as below
pt admitted with severe hypotension, s/p 3.25L of IVF in ED  - admitted to MICU for pressors, but was able to be weaned off quickly   - possibly 2/2 severe dehydration/hypovolemia vs septic vs medication induced   - BP remains soft but pt mentating well, MAP>65  - encourage hydration   - c/t hold home valsartan 80 and lasix 40  - SIRS work-up as below
Septic shock vs hypovolemic shock   - Pt initially hypotensive, likely hypovolemic vs septic, required levophed gtt in ED. Received 3L of IVF and 250cc albumin bolus, also started on D5 + LR  - Weaned off of levophed once pt arrived to MICU  - Continue to monitor BP, ensure pt is adequately hydrated.  - Hold home valsartan 80 and lasix 40  - Pt hypothermic to 88F in ED, WBC 15-16, tachycardic to 110s, lactate 2.8 initially. Unclear source  - UA negative, Flu/COVID/RSV negative. CT C/A/P also negative for infectious etiology, s/p vanc and zosyn in ED.  - Procal 0.12  - Trend WBC and fever curve  - BCx x 2 and UCx obtained in ED, f/u results  - Will empirically treat for CAP Ceftriaxone (3/24 - ), Azithromycin (3/24 - 3/26)
Septic shock vs hypovolemic shock   - Pt initially hypotensive, likely hypovolemic vs septic, required levophed gtt in ED. Received 3L of IVF and 250cc   - Weaned off of levophed once pt arrived to MICU  - Continue to monitor BP, ensure pt is adequately hydrated.  - Holding home valsartan 80 and lasix 40  - Pt hypothermic to 89F in ED, WBC 15-16, tachycardic to 110s, lactate 2.8 initially. Unclear source  - UA negative, Flu/COVID/RSV negative. CT C/A/P also negative for infectious etiology, s/p vanc and zosyn in ED.  - Procal 0.12  - Trend WBC and fever curve  - BCx x 2 and UCx NGTD  - Will empirically treat for CAP Ceftriaxone (3/24 - ), Azithromycin (3/24 - 3/26)
pt admitted with severe hypotension, s/p 3.25L of IVF in ED  - admitted to MICU for pressors, but was able to be weaned off quickly   - possibly 2/2 severe dehydration/hypovolemia vs septic vs medication induced   - BP remains soft but pt mentating well, MAP>65  - c/w maintenance IVF for now   - c/w hold home valsartan 80 and lasix 40  - SIRS work-up as below

## 2024-03-29 NOTE — PROGRESS NOTE ADULT - SUBJECTIVE AND OBJECTIVE BOX
PULMONARY CONSULT SERVICE FOLLOW-UP NOTE    INTERVAL HPI:  Reviewed chart and overnight events; patient seen and examined at bedside. No acute overnight events. Pt denied dyspnea, chest pain, however reported of unresolved LE swelling.     MEDICATIONS:  Pulmonary:    Antimicrobials:    Anticoagulants:  apixaban 10 milliGRAM(s) Oral two times a day    Cardiac:      Allergies    No Known Allergies    Intolerances        Vital Signs Last 24 Hrs  T(C): 36.6 (29 Mar 2024 09:00), Max: 37 (28 Mar 2024 16:12)  T(F): 97.8 (29 Mar 2024 09:00), Max: 98.6 (28 Mar 2024 16:12)  HR: 77 (29 Mar 2024 09:00) (72 - 88)  BP: 108/64 (29 Mar 2024 09:00) (94/57 - 108/64)  BP(mean): --  RR: 18 (29 Mar 2024 09:00) (18 - 18)  SpO2: 100% (29 Mar 2024 09:00) (96% - 100%)    Parameters below as of 29 Mar 2024 09:00  Patient On (Oxygen Delivery Method): room air        03-28 @ 07:01  -  03-29 @ 07:00  --------------------------------------------------------  IN: 0 mL / OUT: 1100 mL / NET: -1100 mL          PHYSICAL EXAM:    General: not in distress, well-developed, engaging in conversation  Head: NC/AT  EENT: PERRL, anicteric sclera;   Neck: supple, no appreciable JVD  Respiratory: CTA B/L; no wheezing, rales or rhonchi noted   Cardiovascular: +S1/S2, No murmur   Gastrointestinal: soft, NT/ND  Extremities: + bilateral peripheral pulses, onychomycosis with severe keratinization of the nails of all toes, +nonpitting bilateral LE edema, LLE tenderness upon deep palpation   Neurological: awake and alert;    LABS:      CBC Full  -  ( 28 Mar 2024 09:15 )  WBC Count : 7.97 K/uL  RBC Count : 3.63 M/uL  Hemoglobin : 11.0 g/dL  Hematocrit : 32.0 %  Platelet Count - Automated : 276 K/uL  Mean Cell Volume : 88.2 fl  Mean Cell Hemoglobin : 30.3 pg  Mean Cell Hemoglobin Concentration : 34.4 gm/dL  Auto Neutrophil # : x  Auto Lymphocyte # : x  Auto Monocyte # : x  Auto Eosinophil # : x  Auto Basophil # : x  Auto Neutrophil % : x  Auto Lymphocyte % : x  Auto Monocyte % : x  Auto Eosinophil % : x  Auto Basophil % : x    03-28    141  |  105  |  15  ----------------------------<  145<H>  4.4   |  23  |  0.80    Ca    8.6      28 Mar 2024 09:15            Urinalysis Basic - ( 28 Mar 2024 09:15 )    Color: x / Appearance: x / SG: x / pH: x  Gluc: 145 mg/dL / Ketone: x  / Bili: x / Urobili: x   Blood: x / Protein: x / Nitrite: x   Leuk Esterase: x / RBC: x / WBC x   Sq Epi: x / Non Sq Epi: x / Bacteria: x                RADIOLOGY & ADDITIONAL STUDIES:

## 2024-03-29 NOTE — PROGRESS NOTE ADULT - PROBLEM SELECTOR PLAN 8
HAGMA, likely 2/2 ketosis  - Suspect starvation ketosis   - now resolved, AG closed, bicarb WNL
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  - TSH 0.97, free T4 1
HAGMA, likely 2/2 ketosis  - Suspect starvation ketosis   - now resolved, AG closed, bicarb WNL
- Xray Foot shows acute minimally displaced fracture involving the L inferior aspect of the anterior calcaneal articular surface of the talus although may be artificial due to tilting of the foot  -seen by Podiatry appreciate recs, pt with no pain at site  -CT LLE noncon without fracture
HAGMA, likely 2/2 ketosis  - Suspect starvation ketosis   - now resolved, AG closed, bicarb WNL
HAGMA, likely 2/2 ketosis  - Suspect starvation ketosis   - now resolved, AG closed, bicarb WNL

## 2024-03-29 NOTE — PROGRESS NOTE ADULT - PROBLEM SELECTOR PROBLEM 2
Pulmonary thromboembolism
Pulmonary thromboembolism
SIRS without infection with organ dysfunction

## 2024-03-29 NOTE — PROGRESS NOTE ADULT - PROBLEM SELECTOR PLAN 10
- pt denies being on medications for Parkinson's, notes difficulty to walk. Uses a walker at home  - Eventual neuro eval
- ENT called for incidental findings on CT of free air from subclavian vein to R AJ and right neck  - Pt without any discomfort, pain, denies dysphagia, odynophagia, dysphonia, sob, dyspnea, changes in voice or inability to tolerated secretions  - Pt deferring any further work up 2/2 she feels fine and  is tolerating regular diet.  - pt refusing indirect laryngoscopy at this time    Dilated CBD to 8mm  -  RUQ U/S with nonspecific findings
- pt denies being on medications for Parkinson's, notes difficulty to walk. Uses a walker at home  - outpt neuro eval, previously followed with Dr Zafar
CT spine with R sided intravenous air including R subclavian and IJ veins extending into deep tissues of neck- vascular consulted NTD    DVT ppx: heparin gtt   Diet: DASH   Dispo pending clinical improvement   PT pending  Plan discussed with pt at bedside

## 2024-03-29 NOTE — PROGRESS NOTE ADULT - PROBLEM SELECTOR PLAN 5
Troponin 68 -> 67 -> 85-->153-->137  -EKG, V2-V4 with TWI, STD V3-V4 ~1mm, no RUFINA, pt without CP or SOB, asymptomatic. Denied hx of CAD, reports she was supposed to have stress test done at some point but did not pursue  -Trend to peak, pt asymptomatic with no CP  -TTE showing left ventricular wall thickness is mildly increased, ejection fraction visually estimated at 65% left atrium is mildly dilated, trace MVR, mild TR  - pt will need to set-up outpt PMD for ongoing management
Troponin 68 -> 67 -> 85-->153-->137  -EKG, V2-V4 with TWI, STD V3-V4 ~1mm, no RUFINA, pt without CP or SOB, asymptomatic. Denied hx of CAD, reports she was supposed to have stress test done at some point but did not pursue  -Trend to peak, pt asymptomatic with no CP  -TTE showing left ventricular wall thickness is mildly increased, ejection fraction visually estimated at 65% left atrium is mildly dilated, trace MVR, mild TR  - pt will need to set-up outpt PMD for ongoing management
- New?, transient afib on initial EKG but in sinus since  - On heparin infusion for PE  - Rates ~100  - TTE as above
Troponin 68 -> 67 -> 85-->153-->137  -EKG, V2-V4 with TWI, STD V3-V4 ~1mm, no RUFINA, pt without CP or SOB, asymptomatic. Denied hx of CAD, reports she was supposed to have stress test done at some point but did not pursue  -Trend to peak, pt asymptomatic with no CP  -TTE showing left ventricular wall thickness is mildly increased, ejection fraction visually estimated at 65% left atrium is mildly dilated, trace MVR, mild TR  - pt will need to set-up outpt PMD for ongoing management
Troponin 68 -> 67 -> 85-->153-->137  -EKG, V2-V4 with TWI, STD V3-V4 ~1mm, no RUFINA, pt without CP or SOB, asymptomatic. Denied hx of CAD, reports she was supposed to have stress test done at some point but did not pursue  -Trend to peak, pt asymptomatic with no CP  -TTE showing left ventricular wall thickness is mildly increased, ejection fraction visually estimated at 65% left atrium is mildly dilated, trace MVR, mild TR  - pt will need to set-up outpt PMD for ongoing management
- New?, transient afib on initial EKG but in sinus since  - On heparin infusion for PE  - Rates ~100  - TTE as above

## 2024-11-07 NOTE — DIETITIAN INITIAL EVALUATION ADULT - LOSS OF SUBCUTANEOUS FAT
11/07/24 12:56 PM    Patient was flagged by a Third Party Payer report as being due for a refill on the following medications, LISINOPRIL   TAB 5MG. Patient was contacted to review these medications. Patient stated no refills needed. No further action is required.     Thank you.  Rosario Leos MA  [facility]    
Orbital.../Buccal...
